# Patient Record
Sex: FEMALE | Race: WHITE | NOT HISPANIC OR LATINO | Employment: STUDENT | URBAN - METROPOLITAN AREA
[De-identification: names, ages, dates, MRNs, and addresses within clinical notes are randomized per-mention and may not be internally consistent; named-entity substitution may affect disease eponyms.]

---
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
## 2022-01-21 NOTE — PROGRESS NOTES
----- Message from Jordyn Avalos sent at 10/7/2019  4:52 PM CDT -----  Contact: pt@ [phone]  Caller is returning a missed call from Dr. Oretz's office  (Evelina), please call.   ----- Message from Marv Radford sent at 10/7/2019 12:50 PM CDT -----  [facility]: [facility]  Pt is calling to move botox appt on 11/08 to 11/07 or the week after   Left message on voicemail requesting a call back.    Left message on voicemail requesting a call back.    Subjective     Juana Bacon is a 18 year old female who presents to clinic today for the following health issues:    HPI   Concern - Toe infection   Onset: 10 days     Description:   Big toe, left foot. [place] thought [place] had an ingrown toenail, now she is wondering if it was some sort of cut that got [place] the [place].   outer area of toenail is oozing when left open. Redness, slight swelling.  Pt states its very painful.     Intensity: moderate    Progression of Symptoms:  Worsening      Therapies Tried and outcome: soaked in hot water (yesterday), tried an OTC ingrown toenail gel.     Patient Active Problem List   Diagnosis     Exercise-induced asthma     Excessive or frequent menstruation     Past Surgical History:   Procedure Laterality Date     EXCISE MASS FOOT  2013    Procedure: EXCISE MASS FOOT;  Excise soft tissue mass left foot;  Surgeon: Mick Walden DPM;  Location: [facility] OR     NO HISTORY OF SURGERY         Social History     Tobacco Use     Smoking status: Never Smoker     Smokeless tobacco: Never Used     Tobacco comment: No smokers in the home   Substance Use Topics     Alcohol use: No     Family History   Problem Relation Age of Onset     Congenital Anomalies Sister          at 3 days old from complications of intrauterine chicken pox     Diabetes Paternal Grandfather      Cancer Paternal Grandfather         kidney     Hypertension Paternal Grandfather      Cancer Paternal Grandmother         voice box         Current Outpatient Medications   Medication Sig Dispense Refill     albuterol (PROAIR HFA, PROVENTIL HFA, VENTOLIN HFA) 108 (90 BASE) MCG/ACT inhaler 2 puffs with spacer 15 min before exercise 1 Inhaler 6     norgestimate-ethinyl estradiol (ORTHO-CYCLEN/SPRINTEC) 0.25-35 MG-MCG tablet Take 1 tablet by mouth daily 84 tablet 3     polyethylene glycol (MIRALAX) powder Take 17 g (1 capful) by mouth daily 510 g 1      sulfamethoxazole-trimethoprim (BACTRIM DS/SEPTRA DS) 800-160 MG tablet Take 1 tablet by mouth 2 times daily for 10 days 20 tablet 0     Allergies   Allergen Reactions     Amoxicillin Hives     Recent Labs   Lab Test 10/04/18  1605 01/12/17  1612   ALT  --  15   CR  --  0.70   GFRESTIMATED  --  GFR not calculated, patient <16 years old.  Non  GFR Calc     GFRESTBLACK  --  GFR not calculated, patient <16 years old.   GFR Calc     POTASSIUM  --  4.2   TSH 1.56  --       BP Readings from Last 3 Encounters:   07/15/19 114/70 (64 %/ 66 %)*   01/07/19 94/62 (5 %/ 31 %)*   10/22/18 102/68 (23 %/ 58 %)*     *BP percentiles are based on the August 2017 AAP Clinical Practice Guideline for girls    Wt Readings from Last 3 Encounters:   07/15/19 58.5 kg (128 lb 14.4 oz) (65 %)*   01/07/19 54.7 kg (120 lb 8 oz) (52 %)*   10/22/18 58.3 kg (128 lb 8 oz) (67 %)*     * Growth percentiles are based on CDC (Girls, 2-20 Years) data.                    Reviewed and updated as needed this visit by Provider         Review of Systems   ROS COMP: Constitutional, HEENT, cardiovascular, pulmonary, GI, , musculoskeletal, neuro, skin, endocrine and psych systems are negative, except as otherwise noted.      Objective    There were no vitals taken for this visit.  There is no height or weight on file to calculate BMI.  Physical Exam   GENERAL: healthy, alert and no distress  NECK: no adenopathy, no asymmetry, masses, or scars and thyroid normal to palpation  RESP: lungs clear to auscultation - no rales, rhonchi or wheezes  CV: regular rate and rhythm, normal S1 S2, no S3 or S4, no murmur, click or rub, no peripheral edema and peripheral pulses strong  SKIN: left great toenail ingrown and erythema, tender, warm.     Assessment & Plan     1. Infection, nail, ingrowing  Home care instructions were reviewed with the patient. The risks, benefits and treatment options of prescribed medications or other treatments have  been discussed with the patient. The patient verbalized their understanding and should call or follow up if no improvement or if they develop further problems.    - sulfamethoxazole-trimethoprim (BACTRIM DS/SEPTRA DS) 800-160 MG tablet; Take 1 tablet by mouth 2 times daily for 10 days  Dispense: 20 tablet; Refill: 0       Patient Instructions   Soak in epsom salt 2 times daily for 20 minutes then lift up on nail bed to express puss.     Please take antibiotic with a probiotic or yogurt (3 small containers) daily not directly with the antibiotic for optimal good bacterial protection.     Return to clinic if symptoms do not clear with treatment plan      VIKI Saeed [facility]     Alert-The patient is alert, awake and responds to voice. The patient is oriented to time, place, and person. The triage nurse is able to obtain subjective information.

## 2022-05-12 ENCOUNTER — OFFICE VISIT (OUTPATIENT)
Dept: PODIATRY | Facility: CLINIC | Age: 22
End: 2022-05-12
Payer: COMMERCIAL

## 2022-05-12 VITALS
BODY MASS INDEX: 23.16 KG/M2 | DIASTOLIC BLOOD PRESSURE: 66 MMHG | HEIGHT: 65 IN | SYSTOLIC BLOOD PRESSURE: 100 MMHG | WEIGHT: 139 LBS

## 2022-05-12 DIAGNOSIS — S86.891A RIGHT MEDIAL TIBIAL STRESS SYNDROME, INITIAL ENCOUNTER: Primary | ICD-10-CM

## 2022-05-12 DIAGNOSIS — Q66.6 PES VALGUS: ICD-10-CM

## 2022-05-12 PROCEDURE — 99203 OFFICE O/P NEW LOW 30 MIN: CPT | Performed by: PODIATRIST

## 2022-05-12 ASSESSMENT — PAIN SCALES - GENERAL: PAINLEVEL: NO PAIN (0)

## 2022-05-12 NOTE — LETTER
2022         RE: Juana Bacon  [address] [facility] [address]        Dear Colleague,    Thank you for referring your patient, Juana Bacon, to the [facility]. Please see a copy of my visit note below.    HPI:  Juana Bacon is a 21 year old female who is seen in consultation at the request of self.    Pt presents for eval of:   (Onset, Location, L/R, Character, Treatments, Injury if yes)     Onset 2021, left and right ankle pain that radiates to feet and lower legs. History of shin splints.  Constant swelling. Intermittent, throbbing, dull ache, pain 5-9/10, worse with running.    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    Patient Active Problem List   Diagnosis     Exercise-induced asthma     Excessive or frequent menstruation       PAST MEDICAL HISTORY:   Past Medical History:   Diagnosis Date     Exercise-induced asthma      Hydronephrosis, right 2017     Unspecified fetal and  jaundice     treated with phototherapy in the  period     Volume depletion     hospitalized at 7 mo for dehydration with Rotavirus        PAST SURGICAL HISTORY:   Past Surgical History:   Procedure Laterality Date     EXCISE MASS FOOT  2013    Procedure: EXCISE MASS FOOT;  Excise soft tissue mass left foot;  Surgeon: Mick Walden DPM;  Location: [facility] OR     NO HISTORY OF SURGERY          MEDICATIONS:   Current Outpatient Medications:      AJOVY SOSY subcutaneous, , Disp: , Rfl:      naproxen (NAPROSYN) 500 MG tablet, TK 1 T PO BID PRF HA, Disp: , Rfl:      norgestimate-ethinyl estradiol (ORTHO-CYCLEN) 0.25-35 MG-MCG tablet, Take 1 tablet by mouth daily, Disp: 84 tablet, Rfl: 3     polyethylene glycol (MIRALAX) 17 GM/Dose powder, Take 17 g (1 capful) by mouth  "daily, Disp: 510 g, Rfl: 1     ALLERGIES:    Allergies   Allergen Reactions     Amoxicillin Hives        SOCIAL HISTORY:   Social History     Socioeconomic History     Marital status: Single     Spouse name: Not on file     Number of children: Not on file     Years of education: Not on file     Highest education level: Not on file   Occupational History     Not on file   Tobacco Use     Smoking status: Never Smoker     Smokeless tobacco: Never Used     Tobacco comment: No smokers in the home   Vaping Use     Vaping Use: Never used   Substance and Sexual Activity     Alcohol use: No     Drug use: No     Sexual activity: Not Currently     Partners: Male     Birth control/protection: Pill   Other Topics Concern     Parent/sibling w/ CABG, MI or angioplasty before 65F 55M? No   Social History Narrative     Not on file     Social Determinants of Health     Financial Resource Strain: Not on file   Food Insecurity: Not on file   Transportation Needs: Not on file   Physical Activity: Not on file   Stress: Not on file   Social Connections: Not on file   Intimate Partner Violence: Not on file   Housing Stability: Not on file        FAMILY HISTORY:   Family History   Problem Relation Age of Onset     Congenital Anomalies Sister          at 3 days old from complications of intrauterine chicken pox     Diabetes Paternal Grandfather      Cancer Paternal Grandfather         kidney     Hypertension Paternal Grandfather      Cancer Paternal Grandmother         voice box        EXAM:Vitals: /66   Ht 1.651 m (5' 5\")   Wt 63 kg (139 lb)   LMP 2022 (Approximate)   Breastfeeding No   BMI 23.13 kg/m    BMI= Body mass index is 23.13 kg/m .    General appearance: Patient is alert and fully cooperative with history & exam.  No sign of distress is noted during the visit.     Psychiatric: Affect is pleasant & appropriate.  Patient appears motivated to improve health.     Respiratory: Breathing is regular & unlabored while " sitting.     HEENT: Hearing is intact to spoken word.  Speech is clear.  No gross evidence of visual impairment that would impact ambulation.     Vascular: DP & PT pulses are intact & regular bilaterally.  No significant edema or varicosities noted.  CFT and skin temperature is normal to both lower extremities.     Neurologic: Lower extremity sensation is intact to light touch.  No evidence of weakness or contracture in the lower extremities.  No evidence of neuropathy.    Dermatologic: Skin is intact to both lower extremities with adequate texture, turgor and tone about the integument.  No paronychia or evidence of soft tissue infection is noted.     Musculoskeletal: Patient is ambulatory without assistive device or brace.  Upon first evaluation her foot looks fairly rectus however upon loading her foot significant valgus is noted with the foot lateral to the leg and mild tibial varum.  No crepitus throughout range of motion of the rear foot or ankle.  Subtle discomfort noted with firm palpation along the posterior medial crest of the tibia on the distal one third of both legs right greater than left.  Manual muscle strength is 5/5 to all 4 quadrants.  Minimal symptoms with firm palpation about the posterior subtalar and ankle joint margin.  No pain with plantarflexion of the hallux against resistance.    CT scan demonstrates os trigonum.     ASSESSMENT:       ICD-10-CM    1. Right medial tibial stress syndrome, initial encounter  S86.891A [facility] DME Orthotic; Foot Orthotics; Bilateral   2. Pes valgus  Q66.6 [facility]otics [facility] Prosthetics DME Orthotic; Foot Orthotics; Bilateral        PLAN:  Reviewed patient's chart in [facility].      5/12/2022   Interpreted CT scan written results  Recommended a custom molded orthotic and appropriate training techniques and discussed etiology and treatment options regarding shinsplints.  May follow-up if this remains symptomatic.    Jose Garza DPM            Again,  thank you for allowing me to participate in the care of your patient.        Sincerely,        Jose Garza DPM

## 2022-05-12 NOTE — PATIENT INSTRUCTIONS
Reliable shoe stores: To maximize your experience and provide the best possible fit.  Be sure to show them your foot concerns and tell them Dr. Garza sent you.      Stores listed in bold have only athletic shoes, and stores that are not bold are mostly casual or variety of shoes    [employer]  [address] [place] [address]  [phone]    [facility] [facility] [place]  [address] [place] [address]  [phone]     [facility] [place]  [address] [place] [address]  [phone]    [employer]  [address] [facility] [address]  [phone]    [employer]  [address] [place], [place] [address] [phone]    [employer]  [address] [place] [address]  [phone]                         [employer] [employer] Locations:     [address] [place] [address]   [phone]     [address] [place] [address] [address] [place] [address]   [phone]     [address] [place] [address]   [phone]     [address] [facility] [place] [address]   [phone]     [address] [place] [address]   [phone]     [address] [place] [address]   [phone]     [address] [facility] [address]   [place] [address]   [phone]     [address] [facility]   [place] [address]   [phone]             How to find reasonable shoes          The correct width    Correct Fitting    Correct Length      Foot Distortion    Posture Distortion                          Torsional Rigidity      Grasp behind the heel and underneath the foot and twist      Bad    Excessive torsion/twist in midfoot     Less torsion/twist in midfoot is better                   Heel Counter Rigidity      Grasp just above   midsole and squeeze      Bad    Soft heel counter      Good    Rigid Heel Counter      Flexion Rigidity      Grasp shoe and bend from forefoot to rearfoot                  Chronic ankle instability    Diminished ankle  "proprioception.  Strengthen the muscles that cross the ankle to prevent instability and loss of joint function.    [email or web address]     Search youtube for \"natalioarsamara girl\" to understand how they work    Bongo board or [employer] from [email or web address]    "

## 2022-05-12 NOTE — PROGRESS NOTES
HPI:  Juana Bacon is a 21 year old female who is seen in consultation at the request of self.    Pt presents for eval of:   (Onset, Location, L/R, Character, Treatments, Injury if yes)     Onset 2021, left and right ankle pain that radiates to feet and lower legs. History of shin splints.  Constant swelling. Intermittent, throbbing, dull ache, pain 5-9/10, worse with running.    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    Patient Active Problem List   Diagnosis     Exercise-induced asthma     Excessive or frequent menstruation       PAST MEDICAL HISTORY:   Past Medical History:   Diagnosis Date     Exercise-induced asthma      Hydronephrosis, right 2017     Unspecified fetal and  jaundice     treated with phototherapy in the  period     Volume depletion     hospitalized at 7 mo for dehydration with Rotavirus        PAST SURGICAL HISTORY:   Past Surgical History:   Procedure Laterality Date     EXCISE MASS FOOT  2013    Procedure: EXCISE MASS FOOT;  Excise soft tissue mass left foot;  Surgeon: Mick Walden DPM;  Location: [facility] OR     NO HISTORY OF SURGERY          MEDICATIONS:   Current Outpatient Medications:      AJOVY SOSY subcutaneous, , Disp: , Rfl:      naproxen (NAPROSYN) 500 MG tablet, TK 1 T PO BID PRF HA, Disp: , Rfl:      norgestimate-ethinyl estradiol (ORTHO-CYCLEN) 0.25-35 MG-MCG tablet, Take 1 tablet by mouth daily, Disp: 84 tablet, Rfl: 3     polyethylene glycol (MIRALAX) 17 GM/Dose powder, Take 17 g (1 capful) by mouth daily, Disp: 510 g, Rfl: 1     ALLERGIES:    Allergies   Allergen Reactions     Amoxicillin Hives        SOCIAL HISTORY:   Social History     Socioeconomic History     Marital status: Single     Spouse name: Not on file     Number of children: Not  "on file     Years of education: Not on file     Highest education level: Not on file   Occupational History     Not on file   Tobacco Use     Smoking status: Never Smoker     Smokeless tobacco: Never Used     Tobacco comment: No smokers in the home   Vaping Use     Vaping Use: Never used   Substance and Sexual Activity     Alcohol use: No     Drug use: No     Sexual activity: Not Currently     Partners: Male     Birth control/protection: Pill   Other Topics Concern     Parent/sibling w/ CABG, MI or angioplasty before 65F 55M? No   Social History Narrative     Not on file     Social Determinants of Health     Financial Resource Strain: Not on file   Food Insecurity: Not on file   Transportation Needs: Not on file   Physical Activity: Not on file   Stress: Not on file   Social Connections: Not on file   Intimate Partner Violence: Not on file   Housing Stability: Not on file        FAMILY HISTORY:   Family History   Problem Relation Age of Onset     Congenital Anomalies Sister          at 3 days old from complications of intrauterine chicken pox     Diabetes Paternal Grandfather      Cancer Paternal Grandfather         kidney     Hypertension Paternal Grandfather      Cancer Paternal Grandmother         voice box        EXAM:Vitals: /66   Ht 1.651 m (5' 5\")   Wt 63 kg (139 lb)   LMP 2022 (Approximate)   Breastfeeding No   BMI 23.13 kg/m    BMI= Body mass index is 23.13 kg/m .    General appearance: Patient is alert and fully cooperative with history & exam.  No sign of distress is noted during the visit.     Psychiatric: Affect is pleasant & appropriate.  Patient appears motivated to improve health.     Respiratory: Breathing is regular & unlabored while sitting.     HEENT: Hearing is intact to spoken word.  Speech is clear.  No gross evidence of visual impairment that would impact ambulation.     Vascular: DP & PT pulses are intact & regular bilaterally.  No significant edema or varicosities " noted.  CFT and skin temperature is normal to both lower extremities.     Neurologic: Lower extremity sensation is intact to light touch.  No evidence of weakness or contracture in the lower extremities.  No evidence of neuropathy.    Dermatologic: Skin is intact to both lower extremities with adequate texture, turgor and tone about the integument.  No paronychia or evidence of soft tissue infection is noted.     Musculoskeletal: Patient is ambulatory without assistive device or brace.  Upon first evaluation her foot looks fairly rectus however upon loading her foot significant valgus is noted with the foot lateral to the leg and mild tibial varum.  No crepitus throughout range of motion of the rear foot or ankle.  Subtle discomfort noted with firm palpation along the posterior medial crest of the tibia on the distal one third of both legs right greater than left.  Manual muscle strength is 5/5 to all 4 quadrants.  Minimal symptoms with firm palpation about the posterior subtalar and ankle joint margin.  No pain with plantarflexion of the hallux against resistance.    CT scan demonstrates os trigonum.     ASSESSMENT:       ICD-10-CM    1. Right medial tibial stress syndrome, initial encounter  S86.891A Orthotics and Prosthetics DME Orthotic; Foot Orthotics; Bilateral   2. Pes valgus  Q66.6 Orthotics and Prosthetics DME Orthotic; Foot Orthotics; Bilateral        PLAN:  Reviewed patient's chart in [facility].      5/12/2022   Interpreted CT scan written results  Recommended a custom molded orthotic and appropriate training techniques and discussed etiology and treatment options regarding shinsplints.  May follow-up if this remains symptomatic.    Jose Garza DPM

## 2022-05-27 DIAGNOSIS — N94.6 DYSMENORRHEA: ICD-10-CM

## 2022-05-31 RX ORDER — NORGESTIMATE AND ETHINYL ESTRADIOL 0.25-0.035
1 KIT ORAL DAILY
Qty: 84 TABLET | Refills: 0 | Status: SHIPPED | OUTPATIENT
Start: 2022-05-31 | End: 2022-08-09

## 2022-05-31 NOTE — TELEPHONE ENCOUNTER
Pending Prescriptions:                       Disp   Refills    norgestimate-ethinyl estradiol (ORTHO-CYC*84 tab*0            Sig: Take 1 tablet by mouth daily    Medication is being filled for 1 time marleni refill only due to:  Patient is due for annual in Aug    Please call and help schedule.  Thank you!

## 2022-08-09 ENCOUNTER — OFFICE VISIT (OUTPATIENT)
Dept: FAMILY MEDICINE | Facility: CLINIC | Age: 22
End: 2022-08-09
Payer: COMMERCIAL

## 2022-08-09 ENCOUNTER — MYC MEDICAL ADVICE (OUTPATIENT)
Dept: FAMILY MEDICINE | Facility: CLINIC | Age: 22
End: 2022-08-09

## 2022-08-09 VITALS
OXYGEN SATURATION: 99 % | SYSTOLIC BLOOD PRESSURE: 116 MMHG | DIASTOLIC BLOOD PRESSURE: 70 MMHG | HEART RATE: 74 BPM | HEIGHT: 65 IN | RESPIRATION RATE: 14 BRPM | BODY MASS INDEX: 23.74 KG/M2 | WEIGHT: 142.5 LBS | TEMPERATURE: 98.1 F

## 2022-08-09 DIAGNOSIS — Z00.129 ENCOUNTER FOR ROUTINE CHILD HEALTH EXAMINATION W/O ABNORMAL FINDINGS: Primary | ICD-10-CM

## 2022-08-09 DIAGNOSIS — J45.990 EXERCISE-INDUCED ASTHMA: ICD-10-CM

## 2022-08-09 DIAGNOSIS — N94.6 DYSMENORRHEA: ICD-10-CM

## 2022-08-09 DIAGNOSIS — Z11.3 SCREENING FOR STDS (SEXUALLY TRANSMITTED DISEASES): ICD-10-CM

## 2022-08-09 PROCEDURE — 99395 PREV VISIT EST AGE 18-39: CPT | Performed by: NURSE PRACTITIONER

## 2022-08-09 PROCEDURE — 87591 N.GONORRHOEAE DNA AMP PROB: CPT | Performed by: NURSE PRACTITIONER

## 2022-08-09 PROCEDURE — 96127 BRIEF EMOTIONAL/BEHAV ASSMT: CPT | Performed by: NURSE PRACTITIONER

## 2022-08-09 PROCEDURE — 87491 CHLMYD TRACH DNA AMP PROBE: CPT | Performed by: NURSE PRACTITIONER

## 2022-08-09 RX ORDER — ALBUTEROL SULFATE 90 UG/1
2 AEROSOL, METERED RESPIRATORY (INHALATION) EVERY 6 HOURS
Qty: 18 G | Refills: 4 | Status: SHIPPED | OUTPATIENT
Start: 2022-08-09 | End: 2024-06-12

## 2022-08-09 RX ORDER — NORGESTIMATE AND ETHINYL ESTRADIOL 0.25-0.035
1 KIT ORAL DAILY
Qty: 84 TABLET | Refills: 3 | Status: SHIPPED | OUTPATIENT
Start: 2022-08-09 | End: 2022-09-12

## 2022-08-09 SDOH — ECONOMIC STABILITY: INCOME INSECURITY: IN THE LAST 12 MONTHS, WAS THERE A TIME WHEN YOU WERE NOT ABLE TO PAY THE MORTGAGE OR RENT ON TIME?: NO

## 2022-08-09 ASSESSMENT — ASTHMA QUESTIONNAIRES
QUESTION_1 LAST FOUR WEEKS HOW MUCH OF THE TIME DID YOUR ASTHMA KEEP YOU FROM GETTING AS MUCH DONE AT WORK, SCHOOL OR AT HOME: NONE OF THE TIME
QUESTION_5 LAST FOUR WEEKS HOW WOULD YOU RATE YOUR ASTHMA CONTROL: COMPLETELY CONTROLLED
QUESTION_3 LAST FOUR WEEKS HOW OFTEN DID YOUR ASTHMA SYMPTOMS (WHEEZING, COUGHING, SHORTNESS OF BREATH, CHEST TIGHTNESS OR PAIN) WAKE YOU UP AT NIGHT OR EARLIER THAN USUAL IN THE MORNING: NOT AT ALL
QUESTION_2 LAST FOUR WEEKS HOW OFTEN HAVE YOU HAD SHORTNESS OF BREATH: NOT AT ALL
ACT_TOTALSCORE: 25
ACT_TOTALSCORE: 25
QUESTION_4 LAST FOUR WEEKS HOW OFTEN HAVE YOU USED YOUR RESCUE INHALER OR NEBULIZER MEDICATION (SUCH AS ALBUTEROL): NOT AT ALL

## 2022-08-09 ASSESSMENT — PAIN SCALES - GENERAL: PAINLEVEL: NO PAIN (0)

## 2022-08-09 NOTE — PROGRESS NOTES
{PROVIDER CHARTING PREFERENCE:068173}    Keysha Arias is a 21 year old{ACCOMPANIED BY STATEMENT (Optional):315064}, presenting for the following health issues:  No chief complaint on file.      History of Present Illness       Reason for visit:  Yearly Appointment    She eats 2-3 servings of fruits and vegetables daily.She consumes 1 sweetened beverage(s) daily.She exercises with enough effort to increase her heart rate 60 or more minutes per day.  She exercises with enough effort to increase her heart rate 5 days per week.   She is taking medications regularly.       {SUPERLIST (Optional):199853}  {additonal problems for provider to add (Optional):901599}    Review of Systems   {ROS COMP (Optional):307555}      Objective    There were no vitals taken for this visit.  There is no height or weight on file to calculate BMI.  Physical Exam   {Exam List (Optional):453296}    {Diagnostic Test Results (Optional):605422}    {AMBULATORY ATTESTATION (Optional):661033}              ..

## 2022-08-09 NOTE — LETTER
8/9/2022        RE: Juana KOVACS Jordi  [address] [facility] [address]      Juana was evaluated in the clinic and is approved to return to all sports without restriction.         Sincerely,        VIKI Giraldo CNP

## 2022-08-09 NOTE — PATIENT INSTRUCTIONS
Alex, radha, or what you have on today in the house all the time.       Patient Education    Pegg[employer]S HANDOUT- PATIENT  18 THROUGH 21 YEAR VISITS  Here are some suggestions from [employer] experts that may be of value to your family.     HOW YOU ARE DOING  Enjoy spending time with your family.  Find activities you are really interested in, such as sports, theater, or volunteering.  Try to be responsible for your schoolwork or work obligations.  Always talk through problems and never use violence.  If you get angry with someone, try to walk away.  If you feel unsafe in your home or have been hurt by someone, let us know. Hotlines and community agencies can also provide confidential help.  Talk with us if you are worried about your living or food situation. Community agencies and programs such as SNAP can help.  Don t smoke, vape, or use drugs. Avoid people who do when you can. Talk with us if you are worried about alcohol or drug use in your family.    YOUR DAILY LIFE  Visit the dentist at least twice a year.  Brush your teeth at least twice a day and floss once a day.  Be a healthy eater.  Have vegetables, fruits, lean protein, and whole grains at meals and snacks.  Limit fatty, sugary, salty foods that are low in nutrients, such as candy, chips, and ice cream.  Eat when you re hungry. Stop when you feel satisfied.  Eat breakfast.  Drink plenty of water.  Make sure to get enough calcium every day.  Have 3 or more servings of low-fat (1%) or fat-free milk and other low-fat dairy products, such as yogurt and cheese.  Women: Make sure to eat foods rich in folate, such as fortified grains and dark- green leafy vegetables.  Aim for at least 1 hour of physical activity every day.  Wear safety equipment when you play sports.  Get enough sleep.  Talk with us about managing your health care and insurance as an adult.    YOUR FEELINGS  Most people have ups and downs. If you are feeling sad, depressed,  nervous, irritable, hopeless, or angry, let us know or reach out to another health care professional.  Figure out healthy ways to deal with stress.  Try your best to solve problems and make decisions on your own.  Sexuality is an important part of your life. If you have any questions or concerns, we are here for you.    HEALTHY BEHAVIOR CHOICES  Avoid using drugs, alcohol, tobacco, steroids, and diet pills. Support friends who choose not to use.  If you use drugs or alcohol, let us know or talk with another trusted adult about it. We can help you with quitting or cutting down on your use.  Make healthy decisions about your sexual behavior.  If you are sexually active, always practice safe sex. Always use birth control along with a condom to prevent pregnancy and sexually transmitted infections.  All sexual activity should be something you want. No one should ever force or try to convince you.  Protect your hearing at work, home, and concerts. Keep your earbud volume down.    STAYING SAFE  Always be a safe and cautious .  Insist that everyone use a lap and shoulder seat belt.  Limit the number of friends in the car and avoid driving at night.  Avoid distractions. Never text or talk on the phone while you drive.  Do not ride in a vehicle with someone who has been using drugs or alcohol.  If you feel unsafe driving or riding with someone, call someone you trust to drive you.  Wear helmets and protective gear while playing sports. Wear a helmet when riding a bike, a motorcycle, or an ATV or when skiing or skateboarding.  Always use sunscreen and a hat when you re outside.  Fighting and carrying weapons can be dangerous. Talk with your parents, teachers, or doctor about how to avoid these situations.        Consistent with Bright Futures: Guidelines for Health Supervision of Infants, Children, and Adolescents, 4th Edition  For more information, go to https://brightfutures.aap.org.

## 2022-08-09 NOTE — PROGRESS NOTES
Juana Bacon is 21 year old, here for a preventive care visit.    Assessment & Plan     Diagnoses and all orders for this visit:    Encounter for routine child health examination w/o abnormal findings  -     BEHAVIORAL/EMOTIONAL ASSESSMENT (65950)    Screening for STDs (sexually transmitted diseases)  -     Cancel: NEISSERIA GONORRHOEA PCR; Future  -     Cancel: CHLAMYDIA TRACHOMATIS PCR; Future  -     CHLAMYDIA TRACHOMATIS PCR  -     NEISSERIA GONORRHOEA PCR    Dysmenorrhea  -     norgestimate-ethinyl estradiol (ORTHO-CYCLEN) 0.25-35 MG-MCG tablet; Take 1 tablet by mouth daily    Exercise-induced asthma  -     albuterol (PROAIR HFA/PROVENTIL HFA/VENTOLIN HFA) 108 (90 Base) MCG/ACT inhaler; Inhale 2 puffs into the lungs every 6 hours    Other orders  -     REVIEW OF HEALTH MAINTENANCE PROTOCOL ORDERS        Growth        Normal height and weight    No weight concerns.    Immunizations     Vaccines up to date.  MenB Vaccine not indicated.    Anticipatory Guidance    Reviewed age appropriate anticipatory guidance.   Reviewed Anticipatory Guidance in patient instructions        Referrals/Ongoing Specialty Care  Verbal referral for routine dental care    Follow Up      Return in 1 year (on 8/9/2023) for Preventive Care visit.    Subjective     No flowsheet data found.      Social 8/9/2022   Who do you live with? Family, Friends or roommates   Have you experienced any stressful events recently? None   In the past 12 months, has lack of transportation kept you from medical appointments or from getting medications? No   In the last 12 months, was there a time when you were not able to pay the mortgage or rent on time? No   In the last 12 months, was there a time when you did not have a steady place to sleep or slept in a shelter (including now)? No       Health Risks/Safety 8/9/2022   Do you always wear a seat belt? Yes   Do you wear a helmet for bicyle, rollerblades, skatebard, scooter, skiing/snowboarding,  ATV/snowmobile, motorcycle?  (!) NO       TB Screening 8/9/2022   Were you born outside of the [place]? No     TB Screening 8/9/2022   Since your last Well Child visit, have any of your family members or close contacts had tuberculosis or a positive tuberculosis test?  No   Since your last check-up, have you or any of your family members or close contacts traveled or lived outside of the [place]? No   Since your last check-up, have you lived in a high-risk group setting like a correctional facility, health care facility, homeless shelter, or refugee camp? No        Dyslipidemia Screening 8/9/2022   Have any of your parents or grandparents had a stroke or heart attack before age 55 for males or before age 65 for females? No   Do either of your parents have high cholesterol or currently taking medications to treat? (!) YES    Risk Factors: None    No flowsheet data found.    Diet 8/9/2022   Do you have questions about your eating?  No   Do you have questions about your weight?  No   What do you regularly drink? Water   What type of water? (!) FILTERED   Do you think you eat healthy foods? Yes   Do you get at least 3 servings of food or beverages that have calcium each day (dairy, green leafy vegetables, etc.)? Yes   How would you describe your diet?  No restrictions   Within the past 12 months, you worried that your food would run out before you got money to buy more. Never true   Within the past 12 months, the food you bought just didn't last and you didn't have money to get more. Never true       Activity 8/9/2022   On average, how many days per week do you engage in moderate to strenuous exercise (like walking fast, running, jogging, dancing, swimming, biking, or other activities that cause a light or heavy sweat)? 5 days   On average, how many minutes do you engage in exercise at this level? (!) 50 MINUTES   What do you do for exercise? Sports and weight lifting   What activities are you involved with?  sports teams     Media Use 2022   How many hours per day are you viewing a screen?  2     Sleep 2022   Do you have any trouble with sleep? No     Vision/Hearing 2022   Do you have any concerns about your hearing or vision?  No concerns     Vision Screen  Vision Screen Details  Reason Vision Screen Not Completed: Parent declined - No concerns    Hearing Screen  Hearing Screen Not Completed  Reason Hearing Screen was not completed: Parent declined - No concerns      School 2022   Are you in school? Yes   What school do you attend?  [employer]   What do you do for work?        Psycho-Social/Depression - PSC-17 required for C&TC through age 18  General screening:    Electronic PSC-17   PSC SCORES 2019   Y-PSC Total Score 2 (Negative)      PSC-17 PASS (<15), no follow up necessary  Teen Screen  Teen Screen completed, reviewed and scanned document within chart.    [facility] MENSES SECTION 2022   What are your periods like?  Regular     [place] [employer] Physical 2022   Do you have any concerns that you would like to discuss with your provider? (!) YES   Has a provider ever denied or restricted your participation in sports for any reason? No   Do you have any ongoing medical issues or recent illness? (!) YES   Have you ever passed out or nearly passed out during or after exercise? No   Have you ever had discomfort, pain, tightness, or pressure in your chest during exercise? No   Does your heart ever race, flutter in your chest, or skip beats (irregular beats) during exercise? No   Has a doctor ever told you that you have any heart problems? No   Has a doctor ever requested a test for your heart? For example, electrocardiography (ECG) or echocardiography. No   Do you ever get light-headed or feel shorter of breath than your friends during exercise?  No   Have you ever had a seizure?  No   Has any family member or relative  of heart problems or had an unexpected or  unexplained sudden death before age 35 years (including drowning or unexplained car crash)? No   Does anyone in your family have a genetic heart problem such as hypertrophic cardiomyopathy (HCM), Marfan syndrome, arrhythmogenic right ventricular cardiomyopathy (ARVC), long QT syndrome (LQTS), short QT syndrome (SQTS), Brugada syndrome, or catecholaminergic polymorphic ventricular tachycardia (CPVT)?   No   Has anyone in your family had a pacemaker or an implanted defibrillator before age 35? No   Have you ever had a stress fracture or an injury to a bone, muscle, ligament, joint, or tendon that caused you to miss a practice or game? No   Do you have a bone, muscle, ligament, or joint injury that bothers you?  No   Do you cough, wheeze, or have difficulty breathing during or after exercise?   No   Are you missing a kidney, an eye, a testicle (males), your spleen, or any other organ? No   Do you have groin or testicle pain or a painful bulge or hernia in the groin area? No   Do you have any recurring skin rashes or rashes that come and go, including herpes or methicillin-resistant Staphylococcus aureus (MRSA)? No   Have you had a concussion or head injury that caused confusion, a prolonged headache, or memory problems? No   Have you ever had numbness, tingling, weakness in your arms or legs, or been unable to move your arms or legs after being hit or falling? No   Have you ever become ill while exercising in the heat? (!) YES   Do you or does someone in your family have sickle cell trait or disease? No   Have you ever had, or do you have any problems with your eyes or vision? No   Do you worry about your weight? No   Are you trying to or has anyone recommended that you gain or lose weight? No   Are you on a special diet or do you avoid certain types of foods or food groups? No   Have you ever had an eating disorder? No   Have you ever had a menstrual period? Yes   How old were you when you had your first menstrual  "period? 14   When was your most recent menstrual period? 7/20/22   How many periods have you had in the past 12 months? 12     Constitutional, eye, ENT, skin, respiratory, cardiac, and GI are normal except as otherwise noted.       Objective     Exam  /70   Pulse 74   Temp 98.1  F (36.7  C) (Temporal)   Resp 14   Ht 1.64 m (5' 4.57\")   Wt 64.6 kg (142 lb 8 oz)   LMP 07/20/2022 (Exact Date)   SpO2 99%   BMI 24.03 kg/m    54 %ile (Z= 0.11) based on CDC (Girls, 2-20 Years) Stature-for-age data based on Stature recorded on 8/9/2022.  72 %ile (Z= 0.60) based on CDC (Girls, 2-20 Years) weight-for-age data using vitals from 8/9/2022.  73 %ile (Z= 0.60) based on CDC (Girls, 2-20 Years) BMI-for-age based on BMI available as of 8/9/2022.  Blood pressure percentiles are not available for patients who are 18 years or older.  Physical Exam  GENERAL: Active, alert, in no acute distress.  SKIN: Clear. No significant rash, abnormal pigmentation or lesions  HEAD: Normocephalic  EYES: Pupils equal, round, reactive, Extraocular muscles intact. Normal conjunctivae.  EARS: Normal canals. Tympanic membranes are normal; gray and translucent.  NOSE: Normal without discharge.  MOUTH/THROAT: Clear. No oral lesions. Teeth without obvious abnormalities.  NECK: Supple, no masses.  No thyromegaly.  LYMPH NODES: No adenopathy  LUNGS: Clear. No rales, rhonchi, wheezing or retractions  HEART: Regular rhythm. Normal S1/S2. No murmurs. Normal pulses.  ABDOMEN: Soft, non-tender, not distended, no masses or hepatosplenomegaly. Bowel sounds normal.   NEUROLOGIC: No focal findings. Cranial nerves grossly intact: DTR's normal. Normal gait, strength and tone  BACK: Spine is straight, no scoliosis.  EXTREMITIES: Full range of motion, no deformities  : Exam declined by parent/patient.  Reason for decline: Patient/Parental preference            VIKI Giraldo [facility] LUIS  Answers for HPI/ROS submitted by the " patient on 8/9/2022  What is the reason for your visit today? : Yearly Appointment  How many servings of fruits and vegetables do you eat daily?: 2-3  On average, how many sweetened beverages do you drink each day (Examples: soda, juice, sweet tea, etc.  Do NOT count diet or artificially sweetened beverages)?: 1  How many minutes a day do you exercise enough to make your heart beat faster?: 60 or more  How many days a week do you exercise enough to make your heart beat faster?: 5  How many days per week do you miss taking your medication?: 0

## 2022-08-10 LAB
C TRACH DNA SPEC QL NAA+PROBE: NEGATIVE
N GONORRHOEA DNA SPEC QL NAA+PROBE: NEGATIVE

## 2022-08-12 NOTE — TELEPHONE ENCOUNTER
Provider please review.  [facility]    Joseph Orlando, GAGEN, RN, PHN  [facility] ~ Registered Nurse  Clinic Triage ~ [place] [facility] [place]  August 12, 2022

## 2022-08-22 ENCOUNTER — TELEPHONE (OUTPATIENT)
Dept: PODIATRY | Facility: CLINIC | Age: 22
End: 2022-08-22

## 2022-08-22 ENCOUNTER — OFFICE VISIT (OUTPATIENT)
Dept: PODIATRY | Facility: CLINIC | Age: 22
End: 2022-08-22
Payer: COMMERCIAL

## 2022-08-22 VITALS
HEIGHT: 65 IN | WEIGHT: 142.5 LBS | BODY MASS INDEX: 23.74 KG/M2 | DIASTOLIC BLOOD PRESSURE: 66 MMHG | SYSTOLIC BLOOD PRESSURE: 112 MMHG

## 2022-08-22 DIAGNOSIS — Q68.8 OS TRIGONUM SYNDROME: Primary | ICD-10-CM

## 2022-08-22 PROCEDURE — 99213 OFFICE O/P EST LOW 20 MIN: CPT | Performed by: PODIATRIST

## 2022-08-22 ASSESSMENT — PAIN SCALES - GENERAL: PAINLEVEL: MODERATE PAIN (5)

## 2022-08-22 NOTE — LETTER
2022         RE: Juana Bacon  [address] [facility] [address]        Dear Colleague,    Thank you for referring your patient, Juana Bacon, to the [facility]. Please see a copy of my visit note below.    Chief Complaint   Patient presents with     RECHECK     Presents with posterior Right achilles pain after obtaining orthotics and new shoes - Right medial tibial stress syndrome and pes valgus; MRI L ankle and XR B/L ankle 2022; LOV      HPI:  Juana Bacon is a 21 year old female who is seen in consultation at the request of self.    Pt presents for eval of:   (Onset, Location, L/R, Character, Treatments, Injury if yes)     Onset 2021, left and right ankle pain that radiates to feet and lower legs. History of shin splints heather right.  Constant swelling. Intermittent, throbbing, dull ache, pain 5-9/10, worse with running.    Massage, taping, compression sleeves, different cleats  Had os trigonum injection left ankle April and gave good results for 2 months.   Today wondering what she would have to do to go through this procedure and whether she should do this here or at [employer].  Her ankle remains painful now limiting her physical activities.    Student at [employer], participates in [employer]. 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    Patient Active Problem List   Diagnosis     Exercise-induced asthma     Excessive or frequent menstruation       PAST MEDICAL HISTORY:   Past Medical History:   Diagnosis Date     Exercise-induced asthma      Hydronephrosis, right 2017     Unspecified fetal and  jaundice     treated with phototherapy in the  period     Volume depletion     hospitalized at 7 mo for dehydration with Rotavirus        PAST SURGICAL HISTORY:   Past Surgical History:   Procedure Laterality Date     EXCISE MASS  FOOT  9/30/2013    Procedure: EXCISE MASS FOOT;  Excise soft tissue mass left foot;  Surgeon: Mick Walden DPM;  Location: [facility] OR     NO HISTORY OF SURGERY          MEDICATIONS:   Current Outpatient Medications:      AJOVY SOSY subcutaneous, , Disp: , Rfl:      albuterol (PROAIR HFA/PROVENTIL HFA/VENTOLIN HFA) 108 (90 Base) MCG/ACT inhaler, Inhale 2 puffs into the lungs every 6 hours, Disp: 18 g, Rfl: 4     naproxen (NAPROSYN) 500 MG tablet, TK 1 T PO BID PRF HA, Disp: , Rfl:      norgestimate-ethinyl estradiol (ORTHO-CYCLEN) 0.25-35 MG-MCG tablet, Take 1 tablet by mouth daily, Disp: 84 tablet, Rfl: 3     polyethylene glycol (MIRALAX) 17 GM/Dose powder, Take 17 g (1 capful) by mouth daily, Disp: 510 g, Rfl: 1     ALLERGIES:    Allergies   Allergen Reactions     Amoxicillin Hives        SOCIAL HISTORY:   Social History     Socioeconomic History     Marital status: Single     Spouse name: Not on file     Number of children: Not on file     Years of education: Not on file     Highest education level: Not on file   Occupational History     Not on file   Tobacco Use     Smoking status: Never Smoker     Smokeless tobacco: Never Used     Tobacco comment: No smokers in the home   Vaping Use     Vaping Use: Never used   Substance and Sexual Activity     Alcohol use: No     Drug use: No     Sexual activity: Yes     Partners: Male     Birth control/protection: Pill   Other Topics Concern     Parent/sibling w/ CABG, MI or angioplasty before 65F 55M? No   Social History Narrative     Not on file     Social Determinants of Health     Financial Resource Strain: Not on file   Food Insecurity: Not on file   Transportation Needs: Not on file   Physical Activity: Not on file   Stress: Not on file   Social Connections: Not on file   Intimate Partner Violence: Not on file   Housing Stability: Unknown     Unable to Pay for Housing in the Last Year: No     Number of Places Lived in the Last Year: Not on file     Unstable Housing in  "the Last Year: No        FAMILY HISTORY:   Family History   Problem Relation Age of Onset     Congenital Anomalies Sister          at 3 days old from complications of intrauterine chicken pox     Diabetes Paternal Grandfather      Cancer Paternal Grandfather         kidney     Hypertension Paternal Grandfather      Cancer Paternal Grandmother         voice box        EXAM:Vitals: /66 (BP Location: Left arm, Patient Position: Sitting, Cuff Size: Adult Regular)   Ht 1.64 m (5' 4.57\")   Wt 64.6 kg (142 lb 8 oz)   LMP 2022 (Exact Date)   BMI 24.03 kg/m    BMI= Body mass index is 24.03 kg/m .    General appearance: Patient is alert and fully cooperative with history & exam.  No sign of distress is noted during the visit.     Psychiatric: Affect is pleasant & appropriate.  Patient appears motivated to improve health.     Respiratory: Breathing is regular & unlabored while sitting.     HEENT: Hearing is intact to spoken word.  Speech is clear.  No gross evidence of visual impairment that would impact ambulation.     Vascular: DP & PT pulses are intact & regular bilaterally.  No significant edema or varicosities noted.  CFT and skin temperature is normal to both lower extremities.     Neurologic: Lower extremity sensation is intact to light touch.  No evidence of weakness or contracture in the lower extremities.  No evidence of neuropathy.    Dermatologic: Skin is intact to both lower extremities with adequate texture, turgor and tone about the integument.  No paronychia or evidence of soft tissue infection is noted.     Musculoskeletal: Patient is ambulatory without assistive device or brace.  Upon first evaluation her foot looks fairly rectus however upon loading her foot significant valgus is noted with the foot lateral to the leg and mild tibial varum.  There is no crepitus or tracking through range of motion of the rear foot or ankle however there is clearly discomfort noted with firm palpation " about the posterior left ankle left subtalar joint in the area of the os trigonum.  This is not noted on the right.  No peroneal subluxation is noted but there is subtle discomfort with firm palpation at the insertion of the Achilles tendon upon the calcaneus.    Outside MRI and bilateral ankle x-ray demonstrates os trigonum on the left.  Subtle synovitis is noted about the anterior and posterior left ankle.     ASSESSMENT:       ICD-10-CM    1. 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      5/12/2022   Interpreted CT scan written results  Recommended a custom molded orthotic and appropriate training techniques and discussed etiology and treatment options regarding shinsplints.  May follow-up if this remains symptomatic.    8/22/2022  Discussed treatment options with her she is utilizing orthotics appropriate shoe gear and this continues to limit her activity especially with discomfort about the posterior left ankle.  She would like to have the accessory ossicle removed.  Imaging from contralateral ankle demonstrates no accessory ossicle on the right only on the left.  MRI demonstrates some synovitis about the left ankle anterior and posterior very consistent with os trigonum syndrome.  We discussed the postoperative course and treatment options.  We discussed potential risk and complications all questions were answered.    She would like to discuss this further with her family to determine if she would like to do this here versus in [place] near where she is going to school.  We discussed the pros and cons both ways and she will call to schedule this if she would like to do this here in [place].    Jose Garza DPM      Patient will call if she would like to move forward with surgery here versus at [facility].    Surgery:  Patient Name:  Juana Bacon ([id number])  Procedure:    Excision os trigonum left ankle  Diagnosis:    Painful os trigonum syndrome left  ankle  Assistant: first assist  Surgeon:  Jose Garza DPM  Anesthesia:  General  PT type:  Same Day Surgery  Time needed: 75 minutes  Patient position:  right lateral recumbant and with castillo bag  Mini fluoro:  No  C-arm:  no  Equipment: Soft tissue instruments to remove accessory ossicle posterior talus  Anticoagulation:  No  Vendor:  no  Surgeon Notes: Student at [employer]    Post op appts:    5-7 days po  12-15 days po  approx 4 weeks  Some flexibility to these times as she is a student at [employer].    Expected work restrictions:  no weight bearing for about 2 weeks with some restrictions for about 1 month    [facility] Discussed:  NO    Urgency to Schedule: Patient will call to schedule.  She is a student at [employer]                    Again, thank you for allowing me to participate in the care of your patient.        Sincerely,        Jose Garza DPM

## 2022-08-22 NOTE — PROGRESS NOTES
Chief Complaint   Patient presents with     RECHECK     Presents with posterior Right achilles pain after obtaining orthotics and new shoes - Right medial tibial stress syndrome and pes valgus; MRI L ankle and XR B/L ankle 2022; LOV      HPI:  Juana Bacon is a 21 year old female who is seen in consultation at the request of self.    Pt presents for eval of:   (Onset, Location, L/R, Character, Treatments, Injury if yes)     Onset 2021, left and right ankle pain that radiates to feet and lower legs. History of shin splints heather right.  Constant swelling. Intermittent, throbbing, dull ache, pain 5-9/10, worse with running.    Massage, taping, compression sleeves, different cleats  Had os trigonum injection left ankle April and gave good results for 2 months.   Today wondering what she would have to do to go through this procedure and whether she should do this here or at [facility].  Her ankle remains painful now limiting her physical activities.    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    Patient Active Problem List   Diagnosis     Exercise-induced asthma     Excessive or frequent menstruation       PAST MEDICAL HISTORY:   Past Medical History:   Diagnosis Date     Exercise-induced asthma      Hydronephrosis, right 2017     Unspecified fetal and  jaundice     treated with phototherapy in the  period     Volume depletion     hospitalized at 7 mo for dehydration with Rotavirus        PAST SURGICAL HISTORY:   Past Surgical History:   Procedure Laterality Date     EXCISE MASS FOOT  2013    Procedure: EXCISE MASS FOOT;  Excise soft tissue mass left foot;  Surgeon: Mick Walden DPM;  Location: [facility] OR     NO HISTORY OF SURGERY          MEDICATIONS:   Current Outpatient Medications:      AJOVY SOSY subcutaneous, ,  Disp: , Rfl:      albuterol (PROAIR HFA/PROVENTIL HFA/VENTOLIN HFA) 108 (90 Base) MCG/ACT inhaler, Inhale 2 puffs into the lungs every 6 hours, Disp: 18 g, Rfl: 4     naproxen (NAPROSYN) 500 MG tablet, TK 1 T PO BID PRF HA, Disp: , Rfl:      norgestimate-ethinyl estradiol (ORTHO-CYCLEN) 0.25-35 MG-MCG tablet, Take 1 tablet by mouth daily, Disp: 84 tablet, Rfl: 3     polyethylene glycol (MIRALAX) 17 GM/Dose powder, Take 17 g (1 capful) by mouth daily, Disp: 510 g, Rfl: 1     ALLERGIES:    Allergies   Allergen Reactions     Amoxicillin Hives        SOCIAL HISTORY:   Social History     Socioeconomic History     Marital status: Single     Spouse name: Not on file     Number of children: Not on file     Years of education: Not on file     Highest education level: Not on file   Occupational History     Not on file   Tobacco Use     Smoking status: Never Smoker     Smokeless tobacco: Never Used     Tobacco comment: No smokers in the home   Vaping Use     Vaping Use: Never used   Substance and Sexual Activity     Alcohol use: No     Drug use: No     Sexual activity: Yes     Partners: Male     Birth control/protection: Pill   Other Topics Concern     Parent/sibling w/ CABG, MI or angioplasty before 65F 55M? No   Social History Narrative     Not on file     Social Determinants of Health     Financial Resource Strain: Not on file   Food Insecurity: Not on file   Transportation Needs: Not on file   Physical Activity: Not on file   Stress: Not on file   Social Connections: Not on file   Intimate Partner Violence: Not on file   Housing Stability: Unknown     Unable to Pay for Housing in the Last Year: No     Number of Places Lived in the Last Year: Not on file     Unstable Housing in the Last Year: No        FAMILY HISTORY:   Family History   Problem Relation Age of Onset     Congenital Anomalies Sister          at 3 days old from complications of intrauterine chicken pox     Diabetes Paternal Grandfather      Cancer Paternal  "Grandfather         kidney     Hypertension Paternal Grandfather      Cancer Paternal Grandmother         voice box        EXAM:Vitals: /66 (BP Location: Left arm, Patient Position: Sitting, Cuff Size: Adult Regular)   Ht 1.64 m (5' 4.57\")   Wt 64.6 kg (142 lb 8 oz)   LMP 07/20/2022 (Exact Date)   BMI 24.03 kg/m    BMI= Body mass index is 24.03 kg/m .    General appearance: Patient is alert and fully cooperative with history & exam.  No sign of distress is noted during the visit.     Psychiatric: Affect is pleasant & appropriate.  Patient appears motivated to improve health.     Respiratory: Breathing is regular & unlabored while sitting.     HEENT: Hearing is intact to spoken word.  Speech is clear.  No gross evidence of visual impairment that would impact ambulation.     Vascular: DP & PT pulses are intact & regular bilaterally.  No significant edema or varicosities noted.  CFT and skin temperature is normal to both lower extremities.     Neurologic: Lower extremity sensation is intact to light touch.  No evidence of weakness or contracture in the lower extremities.  No evidence of neuropathy.    Dermatologic: Skin is intact to both lower extremities with adequate texture, turgor and tone about the integument.  No paronychia or evidence of soft tissue infection is noted.     Musculoskeletal: Patient is ambulatory without assistive device or brace.  Upon first evaluation her foot looks fairly rectus however upon loading her foot significant valgus is noted with the foot lateral to the leg and mild tibial varum.  There is no crepitus or tracking through range of motion of the rear foot or ankle however there is clearly discomfort noted with firm palpation about the posterior left ankle left subtalar joint in the area of the os trigonum.  This is not noted on the right.  No peroneal subluxation is noted but there is subtle discomfort with firm palpation at the insertion of the Achilles tendon upon the " calcaneus.    Outside MRI and bilateral ankle x-ray demonstrates os trigonum on the left.  Subtle synovitis is noted about the anterior and posterior left ankle.     ASSESSMENT:       ICD-10-CM    1. 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      5/12/2022   Interpreted CT scan written results  Recommended a custom molded orthotic and appropriate training techniques and discussed etiology and treatment options regarding shinsplints.  May follow-up if this remains symptomatic.    8/22/2022  Discussed treatment options with her she is utilizing orthotics appropriate shoe gear and this continues to limit her activity especially with discomfort about the posterior left ankle.  She would like to have the accessory ossicle removed.  Imaging from contralateral ankle demonstrates no accessory ossicle on the right only on the left.  MRI demonstrates some synovitis about the left ankle anterior and posterior very consistent with os trigonum syndrome.  We discussed the postoperative course and treatment options.  We discussed potential risk and complications all questions were answered.    She would like to discuss this further with her family to determine if she would like to do this here versus in [place] near where she is going to school.  We discussed the pros and cons both ways and she will call to schedule this if she would like to do this here in [place].    Patient called back the next day requesting to move forward with surgery.    Jose Garza DPM      Patient will call if she would like to move forward with surgery here versus at [facility].    Surgery:  Patient Name:  Juana Bacon ([id number])  Procedure:    Excision os trigonum left ankle  Diagnosis:    Painful os trigonum syndrome left ankle  Assistant: first assist  Surgeon:  Jose Garza DPM  Anesthesia:  General  PT type:  Same Day Surgery  Time needed: 75 minutes  Patient position:  right lateral  recumbant and with bean bag  Mini fluoro:  No  C-arm:  no  Equipment: Soft tissue instruments to remove accessory ossicle posterior talus  Anticoagulation:  No  Vendor:  no  Surgeon Notes: Student at [employer]    Post op appts:    5-7 days po  12-15 days po  approx 4 weeks  Some flexibility to these times as she is a student at [employer].    Expected work restrictions:  no weight bearing for about 2 weeks with some restrictions for about 1 month    [facility] Discussed:  NO    Urgency to Schedule: Patient will call to schedule.  She is a student at [employer]

## 2022-08-22 NOTE — TELEPHONE ENCOUNTER
Reason for Call:  Other call back    Detailed comments: pt seen today- trying to decide if she wants to do procedure discussed at visit here with Kayla or in [place] where she goes to [employer] [facility]. [place] can get her in the 2nd week of October. If she can get in before that here she would like to schedule procedure here with Dr. Garza     Phone Number Patient can be reached at: Home number on file [phone] (home)    Best Time: any    Can we leave a detailed message on this number? YES    Call taken on 8/22/2022 at 2:19 PM by Katherin Conner

## 2022-08-23 NOTE — TELEPHONE ENCOUNTER
Type of surgery:Excision os trigonum left ankle  Location of surgery: [facility]  Date and time of surgery: 9/16  Surgeon: Kayla  Pre-Op Appt Date: [facility]  Post-Op Appt Date: 9/22, pt will do last 2 postops at [facility]   Packet sent out: Yes via [employer]  Pre-cert/Authorization completed:  Not Applicable  Date: na

## 2022-09-09 ENCOUNTER — MYC MEDICAL ADVICE (OUTPATIENT)
Dept: FAMILY MEDICINE | Facility: CLINIC | Age: 22
End: 2022-09-09

## 2022-09-09 DIAGNOSIS — N94.6 DYSMENORRHEA: ICD-10-CM

## 2022-09-09 NOTE — TELEPHONE ENCOUNTER
Birth control was sent to the pharmacy on 08/09/2022 for #84 with 3 refills.      She is asking to miss the placebo week.  Will need sig changed for this.    Will route to provider for response.    Joseph Orlando, GAGEN, RN, PHN  [facility] ~ Registered Nurse  Clinic Triage ~ [facility]  September 9, 2022

## 2022-09-12 RX ORDER — NORGESTIMATE AND ETHINYL ESTRADIOL 0.25-0.035
1 KIT ORAL DAILY
Qty: 112 TABLET | Refills: 3 | Status: SHIPPED | OUTPATIENT
Start: 2022-09-12 | End: 2023-10-26

## 2022-09-15 ENCOUNTER — MYC MEDICAL ADVICE (OUTPATIENT)
Dept: PODIATRY | Facility: CLINIC | Age: 22
End: 2022-09-15

## 2022-09-15 ENCOUNTER — TELEPHONE (OUTPATIENT)
Dept: PODIATRY | Facility: CLINIC | Age: 22
End: 2022-09-15

## 2022-09-15 NOTE — TELEPHONE ENCOUNTER
Spoke with Gabby Hooks MA who states that Dr. Garza has advised that surgery needs to be rescheduled as proceeding would put patient at risk for developing pneumonia.     Patient notified of provider's message as written above. Patient verbalized understanding and has no further questions at this time.     Please call and assist patient in rescheduling procedure.     Reanna Spencer RN   [facility]

## 2022-09-15 NOTE — TELEPHONE ENCOUNTER
David Wong PA-C calling from [facility] and [facility] ([facility] in [place], [place]. Patient is scheduled for an Excision of trigonum of the left ankle 9/16/22. David completed patient's pre-op physical 9/14/22 (records available in Care Everywhere, but note has not yet been signed) and wanted to make Dr. Garza aware that while patient's COVID was negative, she was diagnosed with bacterial pharyngitis three days ago. She is currently on antibiotics, but there was still some exudate and swelling present to patient's throat upon exam yesterday. Please advise. Ok to proceed with procedure as scheduled or will it need to be delayed?    Reanna Spencer RN   [facility]

## 2022-09-15 NOTE — TELEPHONE ENCOUNTER
Due to infection, surgery must be rescheduled per Dr. Garza.    Spoke to patient, rescheduled for 9/30  She still declined to schedule further postops.

## 2022-09-29 ENCOUNTER — ANESTHESIA EVENT (OUTPATIENT)
Dept: SURGERY | Facility: CLINIC | Age: 22
End: 2022-09-29
Payer: COMMERCIAL

## 2022-09-29 NOTE — ANESTHESIA PREPROCEDURE EVALUATION
Anesthesia Pre-Procedure Evaluation    Patient: Juaan Bacon   MRN: [id number] : 2000        Procedure : Procedure(s):  Excision os trigonum left ankle          Past Medical History:   Diagnosis Date     Exercise-induced asthma      Hydronephrosis, right 2017     Unspecified fetal and  jaundice     treated with phototherapy in the  period     Volume depletion     hospitalized at 7 mo for dehydration with Rotavirus      Past Surgical History:   Procedure Laterality Date     EXCISE MASS FOOT  2013    Procedure: EXCISE MASS FOOT;  Excise soft tissue mass left foot;  Surgeon: Mick Walden DPM;  Location: [facility] OR     NO HISTORY OF SURGERY        Allergies   Allergen Reactions     Amoxicillin Hives      Social History     Tobacco Use     Smoking status: Never Smoker     Smokeless tobacco: Never Used     Tobacco comment: No smokers in the home   Substance Use Topics     Alcohol use: No      Wt Readings from Last 1 Encounters:   22 64.6 kg (142 lb 8 oz) (72 %, Z= 0.59)*     * Growth percentiles are based on [facility] (Girls, 2-20 Years) data.        Anesthesia Evaluation   Pt has not had prior anesthetic         ROS/MED HX  ENT/Pulmonary: Comment: Bacterial pharyngitis... patient has been on  ABX for 5 days and symptoms have currently subsided.  No SOB no cough, no sore throat at this time.     Exercise induced asthma     (+) Mild Persistent, asthma Treatment: Inhaler prn,      Neurologic:     (+) migraines,     Cardiovascular:  - neg cardiovascular ROS     METS/Exercise Tolerance:     Hematologic:  - neg hematologic  ROS     Musculoskeletal: Comment: Mass on foot      GI/Hepatic:  - neg GI/hepatic ROS     Renal/Genitourinary:       Endo:  - neg endo ROS     Psychiatric/Substance Use:  - neg psychiatric ROS     Infectious Disease:  - neg infectious disease ROS     Malignancy:  - neg malignancy ROS     Other:  - neg other ROS          Physical Exam    Airway  airway exam normal       Mallampati: II   TM distance: > 3 FB   Neck ROM: full   Mouth opening: > 3 cm    Respiratory Devices and Support         Dental  no notable dental history         Cardiovascular   cardiovascular exam normal       Rhythm and rate: regular and normal     Pulmonary   pulmonary exam normal        breath sounds clear to auscultation           OUTSIDE LABS:  CBC:   Lab Results   Component Value Date    WBC 6.3 10/04/2018    WBC 4.5 04/17/2017    HGB 12.9 10/04/2018    HGB 14.4 04/17/2017    HCT 37.4 10/04/2018    HCT 41.4 04/17/2017     10/04/2018     04/17/2017     BMP:   Lab Results   Component Value Date     01/12/2017    POTASSIUM 4.2 01/12/2017    CHLORIDE 102 01/12/2017    CO2 34 (H) 01/12/2017    BUN 13 01/12/2017    CR 0.70 01/12/2017     (H) 01/12/2017     COAGS:   Lab Results   Component Value Date    PTT 35 04/17/2017    INR 1.07 04/17/2017     POC: No results found for: BGM, HCG, HCGS  HEPATIC:   Lab Results   Component Value Date    ALBUMIN 3.6 01/12/2017    PROTTOTAL 8.1 01/12/2017    ALT 15 01/12/2017    AST 11 01/12/2017    ALKPHOS 80 01/12/2017    BILITOTAL 0.3 01/12/2017     OTHER:   Lab Results   Component Value Date    SARAH 9.2 01/12/2017    TSH 1.56 10/04/2018    T4 1.03 10/04/2018    CRP <2.9 10/04/2018    SED 9 10/04/2018       Anesthesia Plan    ASA Status:  1   NPO Status:  NPO Appropriate    Anesthesia Type: General.     - Airway: ETT   Induction: Intravenous, Propofol.   Maintenance: Balanced.        Consents    Anesthesia Plan(s) and associated risks, benefits, and realistic alternatives discussed. Questions answered and patient/representative(s) expressed understanding.    - Discussed:     - Discussed with:  Patient      - Extended Intubation/Ventilatory Support Discussed: No.      - Patient is DNR/DNI Status: No    Use of blood products discussed: No .     Postoperative Care    Pain management: Oral pain medications.   PONV prophylaxis: Ondansetron (or other  5HT-3), Dexamethasone or Solumedrol, Background Propofol Infusion     Comments:    Other Comments: The risks and benefits of anesthesia, and the alternatives where applicable, have been discussed with the patient, and they wish to proceed.            VIKI Black CRNA

## 2022-09-30 ENCOUNTER — ANESTHESIA (OUTPATIENT)
Dept: SURGERY | Facility: CLINIC | Age: 22
End: 2022-09-30
Payer: COMMERCIAL

## 2022-09-30 ENCOUNTER — HOSPITAL ENCOUNTER (OUTPATIENT)
Facility: CLINIC | Age: 22
Discharge: HOME | End: 2022-09-30
Attending: PODIATRIST | Admitting: PODIATRIST
Payer: COMMERCIAL

## 2022-09-30 ENCOUNTER — APPOINTMENT (OUTPATIENT)
Dept: GENERAL RADIOLOGY | Facility: CLINIC | Age: 22
End: 2022-09-30
Attending: PODIATRIST
Payer: COMMERCIAL

## 2022-09-30 VITALS
OXYGEN SATURATION: 97 % | HEART RATE: 88 BPM | SYSTOLIC BLOOD PRESSURE: 116 MMHG | RESPIRATION RATE: 16 BRPM | TEMPERATURE: 98.6 F | DIASTOLIC BLOOD PRESSURE: 68 MMHG

## 2022-09-30 DIAGNOSIS — Q68.8 OS TRIGONUM SYNDROME: ICD-10-CM

## 2022-09-30 DIAGNOSIS — J45.990 EXERCISE-INDUCED ASTHMA: ICD-10-CM

## 2022-09-30 DIAGNOSIS — N92.0 EXCESSIVE OR FREQUENT MENSTRUATION: Primary | ICD-10-CM

## 2022-09-30 PROCEDURE — 370N000017 HC ANESTHESIA TECHNICAL FEE, PER MIN: Performed by: PODIATRIST

## 2022-09-30 PROCEDURE — 258N000003 HC RX IP 258 OP 636: Performed by: NURSE ANESTHETIST, CERTIFIED REGISTERED

## 2022-09-30 PROCEDURE — 250N000026 HC DESFLURANE, PER MIN: Performed by: PODIATRIST

## 2022-09-30 PROCEDURE — 272N000001 HC OR GENERAL SUPPLY STERILE: Performed by: PODIATRIST

## 2022-09-30 PROCEDURE — 710N000012 HC RECOVERY PHASE 2, PER MINUTE: Performed by: PODIATRIST

## 2022-09-30 PROCEDURE — 250N000025 HC SEVOFLURANE, PER MIN: Performed by: PODIATRIST

## 2022-09-30 PROCEDURE — 250N000009 HC RX 250: Performed by: NURSE ANESTHETIST, CERTIFIED REGISTERED

## 2022-09-30 PROCEDURE — 250N000009 HC RX 250: Performed by: PODIATRIST

## 2022-09-30 PROCEDURE — 999N000141 HC STATISTIC PRE-PROCEDURE NURSING ASSESSMENT: Performed by: PODIATRIST

## 2022-09-30 PROCEDURE — 250N000013 HC RX MED GY IP 250 OP 250 PS 637: Performed by: NURSE ANESTHETIST, CERTIFIED REGISTERED

## 2022-09-30 PROCEDURE — 710N000010 HC RECOVERY PHASE 1, LEVEL 2, PER MIN: Performed by: PODIATRIST

## 2022-09-30 PROCEDURE — 250N000013 HC RX MED GY IP 250 OP 250 PS 637: Performed by: PODIATRIST

## 2022-09-30 PROCEDURE — 250N000011 HC RX IP 250 OP 636: Performed by: NURSE ANESTHETIST, CERTIFIED REGISTERED

## 2022-09-30 PROCEDURE — 250N000011 HC RX IP 250 OP 636: Performed by: PODIATRIST

## 2022-09-30 PROCEDURE — 360N000076 HC SURGERY LEVEL 3, PER MIN: Performed by: PODIATRIST

## 2022-09-30 PROCEDURE — 999N000179 XR SURGERY CARM FLUORO LESS THAN 5 MIN W STILLS

## 2022-09-30 PROCEDURE — 28120 PART REMOVAL OF ANKLE/HEEL: CPT | Mod: LT | Performed by: PODIATRIST

## 2022-09-30 RX ORDER — FENTANYL CITRATE 50 UG/ML
INJECTION, SOLUTION INTRAMUSCULAR; INTRAVENOUS PRN
Status: DISCONTINUED | OUTPATIENT
Start: 2022-09-30 | End: 2022-09-30

## 2022-09-30 RX ORDER — HYDROXYZINE HYDROCHLORIDE 25 MG/1
25 TABLET, FILM COATED ORAL
Status: DISCONTINUED | OUTPATIENT
Start: 2022-09-30 | End: 2022-09-30 | Stop reason: HOSPADM

## 2022-09-30 RX ORDER — OXYCODONE HYDROCHLORIDE 5 MG/1
5 TABLET ORAL EVERY 4 HOURS PRN
Qty: 20 TABLET | Refills: 0 | Status: SHIPPED | OUTPATIENT
Start: 2022-09-30 | End: 2023-05-25

## 2022-09-30 RX ORDER — DEXAMETHASONE SODIUM PHOSPHATE 10 MG/ML
INJECTION, SOLUTION INTRAMUSCULAR; INTRAVENOUS PRN
Status: DISCONTINUED | OUTPATIENT
Start: 2022-09-30 | End: 2022-09-30

## 2022-09-30 RX ORDER — CEFAZOLIN SODIUM/WATER 2 G/20 ML
2 SYRINGE (ML) INTRAVENOUS
Status: COMPLETED | OUTPATIENT
Start: 2022-09-30 | End: 2022-09-30

## 2022-09-30 RX ORDER — DIMENHYDRINATE 50 MG/ML
INJECTION, SOLUTION INTRAMUSCULAR; INTRAVENOUS PRN
Status: DISCONTINUED | OUTPATIENT
Start: 2022-09-30 | End: 2022-09-30

## 2022-09-30 RX ORDER — OXYCODONE HYDROCHLORIDE 5 MG/1
5 TABLET ORAL
Status: DISCONTINUED | OUTPATIENT
Start: 2022-09-30 | End: 2022-09-30 | Stop reason: HOSPADM

## 2022-09-30 RX ORDER — ONDANSETRON 4 MG/1
4 TABLET, ORALLY DISINTEGRATING ORAL EVERY 30 MIN PRN
Status: DISCONTINUED | OUTPATIENT
Start: 2022-09-30 | End: 2022-09-30 | Stop reason: HOSPADM

## 2022-09-30 RX ORDER — DIMENHYDRINATE 50 MG/ML
25 INJECTION, SOLUTION INTRAMUSCULAR; INTRAVENOUS
Status: DISCONTINUED | OUTPATIENT
Start: 2022-09-30 | End: 2022-09-30 | Stop reason: HOSPADM

## 2022-09-30 RX ORDER — BUPIVACAINE HYDROCHLORIDE 5 MG/ML
INJECTION, SOLUTION PERINEURAL PRN
Status: DISCONTINUED | OUTPATIENT
Start: 2022-09-30 | End: 2022-09-30 | Stop reason: HOSPADM

## 2022-09-30 RX ORDER — ONDANSETRON 2 MG/ML
INJECTION INTRAMUSCULAR; INTRAVENOUS PRN
Status: DISCONTINUED | OUTPATIENT
Start: 2022-09-30 | End: 2022-09-30

## 2022-09-30 RX ORDER — PROPOFOL 10 MG/ML
INJECTION, EMULSION INTRAVENOUS CONTINUOUS PRN
Status: DISCONTINUED | OUTPATIENT
Start: 2022-09-30 | End: 2022-09-30

## 2022-09-30 RX ORDER — MEPERIDINE HYDROCHLORIDE 25 MG/ML
12.5 INJECTION INTRAMUSCULAR; INTRAVENOUS; SUBCUTANEOUS
Status: DISCONTINUED | OUTPATIENT
Start: 2022-09-30 | End: 2022-09-30 | Stop reason: HOSPADM

## 2022-09-30 RX ORDER — HYDROMORPHONE HYDROCHLORIDE 1 MG/ML
0.4 INJECTION, SOLUTION INTRAMUSCULAR; INTRAVENOUS; SUBCUTANEOUS EVERY 5 MIN PRN
Status: DISCONTINUED | OUTPATIENT
Start: 2022-09-30 | End: 2022-09-30 | Stop reason: HOSPADM

## 2022-09-30 RX ORDER — SODIUM CHLORIDE, SODIUM LACTATE, POTASSIUM CHLORIDE, CALCIUM CHLORIDE 600; 310; 30; 20 MG/100ML; MG/100ML; MG/100ML; MG/100ML
INJECTION, SOLUTION INTRAVENOUS CONTINUOUS
Status: DISCONTINUED | OUTPATIENT
Start: 2022-09-30 | End: 2022-09-30 | Stop reason: HOSPADM

## 2022-09-30 RX ORDER — ONDANSETRON 4 MG/1
4 TABLET, ORALLY DISINTEGRATING ORAL
Status: DISCONTINUED | OUTPATIENT
Start: 2022-09-30 | End: 2022-09-30 | Stop reason: HOSPADM

## 2022-09-30 RX ORDER — LIDOCAINE HYDROCHLORIDE 20 MG/ML
INJECTION, SOLUTION INFILTRATION; PERINEURAL PRN
Status: DISCONTINUED | OUTPATIENT
Start: 2022-09-30 | End: 2022-09-30

## 2022-09-30 RX ORDER — METHOCARBAMOL 750 MG/1
750 TABLET, FILM COATED ORAL
Status: COMPLETED | OUTPATIENT
Start: 2022-09-30 | End: 2022-09-30

## 2022-09-30 RX ORDER — FENTANYL CITRATE 50 UG/ML
50 INJECTION, SOLUTION INTRAMUSCULAR; INTRAVENOUS EVERY 5 MIN PRN
Status: DISCONTINUED | OUTPATIENT
Start: 2022-09-30 | End: 2022-09-30 | Stop reason: HOSPADM

## 2022-09-30 RX ORDER — ONDANSETRON 2 MG/ML
4 INJECTION INTRAMUSCULAR; INTRAVENOUS EVERY 30 MIN PRN
Status: DISCONTINUED | OUTPATIENT
Start: 2022-09-30 | End: 2022-09-30 | Stop reason: HOSPADM

## 2022-09-30 RX ORDER — PROPOFOL 10 MG/ML
INJECTION, EMULSION INTRAVENOUS PRN
Status: DISCONTINUED | OUTPATIENT
Start: 2022-09-30 | End: 2022-09-30

## 2022-09-30 RX ORDER — CEFAZOLIN SODIUM/WATER 2 G/20 ML
2 SYRINGE (ML) INTRAVENOUS SEE ADMIN INSTRUCTIONS
Status: DISCONTINUED | OUTPATIENT
Start: 2022-09-30 | End: 2022-09-30 | Stop reason: HOSPADM

## 2022-09-30 RX ORDER — LIDOCAINE 40 MG/G
CREAM TOPICAL
Status: DISCONTINUED | OUTPATIENT
Start: 2022-09-30 | End: 2022-09-30 | Stop reason: HOSPADM

## 2022-09-30 RX ORDER — FENTANYL CITRATE 50 UG/ML
50 INJECTION, SOLUTION INTRAMUSCULAR; INTRAVENOUS
Status: DISCONTINUED | OUTPATIENT
Start: 2022-09-30 | End: 2022-09-30 | Stop reason: HOSPADM

## 2022-09-30 RX ORDER — OXYCODONE HYDROCHLORIDE 5 MG/1
5 TABLET ORAL EVERY 4 HOURS PRN
Status: DISCONTINUED | OUTPATIENT
Start: 2022-09-30 | End: 2022-09-30 | Stop reason: HOSPADM

## 2022-09-30 RX ADMIN — SODIUM CHLORIDE, POTASSIUM CHLORIDE, SODIUM LACTATE AND CALCIUM CHLORIDE: 600; 310; 30; 20 INJECTION, SOLUTION INTRAVENOUS at 08:03

## 2022-09-30 RX ADMIN — LIDOCAINE HYDROCHLORIDE 60 MG: 20 INJECTION, SOLUTION INFILTRATION; PERINEURAL at 07:40

## 2022-09-30 RX ADMIN — FENTANYL CITRATE 50 MCG: 50 INJECTION, SOLUTION INTRAMUSCULAR; INTRAVENOUS at 07:40

## 2022-09-30 RX ADMIN — PROPOFOL 200 MG: 10 INJECTION, EMULSION INTRAVENOUS at 07:40

## 2022-09-30 RX ADMIN — LIDOCAINE HYDROCHLORIDE 1 ML: 10 INJECTION, SOLUTION EPIDURAL; INFILTRATION; INTRACAUDAL; PERINEURAL at 06:45

## 2022-09-30 RX ADMIN — HYDROMORPHONE HYDROCHLORIDE 0.5 MG: 1 INJECTION, SOLUTION INTRAMUSCULAR; INTRAVENOUS; SUBCUTANEOUS at 08:33

## 2022-09-30 RX ADMIN — DEXAMETHASONE SODIUM PHOSPHATE 5 MG: 10 INJECTION, SOLUTION INTRAMUSCULAR; INTRAVENOUS at 08:04

## 2022-09-30 RX ADMIN — MIDAZOLAM 2 MG: 1 INJECTION INTRAMUSCULAR; INTRAVENOUS at 07:37

## 2022-09-30 RX ADMIN — ROCURONIUM BROMIDE 50 MG: 50 INJECTION, SOLUTION INTRAVENOUS at 07:40

## 2022-09-30 RX ADMIN — SODIUM CHLORIDE, POTASSIUM CHLORIDE, SODIUM LACTATE AND CALCIUM CHLORIDE: 600; 310; 30; 20 INJECTION, SOLUTION INTRAVENOUS at 06:46

## 2022-09-30 RX ADMIN — Medication 2 G: at 07:37

## 2022-09-30 RX ADMIN — SUGAMMADEX 200 MG: 100 INJECTION, SOLUTION INTRAVENOUS at 09:34

## 2022-09-30 RX ADMIN — ONDANSETRON 4 MG: 2 INJECTION INTRAMUSCULAR; INTRAVENOUS at 09:23

## 2022-09-30 RX ADMIN — FENTANYL CITRATE 50 MCG: 50 INJECTION, SOLUTION INTRAMUSCULAR; INTRAVENOUS at 07:37

## 2022-09-30 RX ADMIN — HYDROMORPHONE HYDROCHLORIDE 0.5 MG: 1 INJECTION, SOLUTION INTRAMUSCULAR; INTRAVENOUS; SUBCUTANEOUS at 07:59

## 2022-09-30 RX ADMIN — OXYCODONE HYDROCHLORIDE 5 MG: 5 TABLET ORAL at 11:28

## 2022-09-30 RX ADMIN — PROPOFOL 150 MCG/KG/MIN: 10 INJECTION, EMULSION INTRAVENOUS at 07:44

## 2022-09-30 RX ADMIN — DIMENHYDRINATE 25 MG: 50 INJECTION, SOLUTION INTRAMUSCULAR; INTRAVENOUS at 08:05

## 2022-09-30 RX ADMIN — METHOCARBAMOL 750 MG: 750 TABLET, FILM COATED ORAL at 11:58

## 2022-09-30 ASSESSMENT — ACTIVITIES OF DAILY LIVING (ADL)
ADLS_ACUITY_SCORE: 37
ADLS_ACUITY_SCORE: 35
ADLS_ACUITY_SCORE: 35

## 2022-09-30 NOTE — BRIEF OP NOTE
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

## 2022-09-30 NOTE — OP NOTE
Procedure Date: 09/30/2022    SURGEON:  Jose Garza DPM    ASSISTANT:  SCOT Quarles    PREOPERATIVE DIAGNOSIS:  Painful os trigonum syndrome, left ankle.    POSTOPERATIVE DIAGNOSIS:  Painful os trigonum syndrome, left ankle.    PLANNED PROCEDURE:  Excision os trigonum, left ankle.    DESCRIPTION OF PROCEDURE:  In the preoperative holding area, informed consent was obtained.  We discussed potential risks, complications with the patient and mother.  No guarantees were given or implied.  We reviewed the postoperative cares, as well as alternative treatment.  No contraindications were noted to the surgery.  She wishes to move forward with the surgery and surgical treatment.  She was brought to the operating room and placed on the operating table in a supine position.  She was then given general anesthesia by the Anesthesia department.  The beanbag had been previously placed under her and she was placed in the right lateral recumbent to allow her left extremity to be exposed laterally and a left thigh tourniquet was applied after copious amounts of Webril padding.  The left leg was prepped and draped in the usual sterile fashion.  Timeout was performed.  The left lower extremity was exsanguinated with an Esmarch.  The cuff was inflated to 350 mmHg.  A 15 blade was then utilized to make a 6 cm incision over the dorsal lateral aspect of the ankle, starting from the distal margin of the fibula about 6 cm proximal, splitting the distance between the posterior peroneal tendons and the Achilles tendon.  Small areas of bleeding were cauterized utilizing a Bovie and 3-0 hand ties were utilized to tie one vein,.  the sural nerve was protected throughout the procedure.  With [employer] retractors and a large rake retractor Metzenbaum scissors was utilized to continue the depth of the dissection.  Peroneal tendons were protected throughout with a rake and [employer] retractor.  Posterior subtalar joint and ankle joint was  identified and the flexor hallucis longus tendon was also identified about the posterior ankle and subtalar joint.  A large accessory ossicle measuring about 7-9 mm noted about the posterior lateral talus just lateral to the extensor hallucis longus tendon and this was noted to be fractured from the talus itself and appeared to be a nonunion as the surface was quite flat as coordinated with the posterior talus and cancellous bone was noted in this region upon removing it.  It was noted to be loose.  Upon first encountering this and mobile and heavy poorly organized soft tissue was surrounding anchoring it to the posterior talus and joint capsule.  These tissues about the posterior joint capsule appeared to be very inflamed and quite vascular in nature.  The extensor hallucis longus tendon was identified after excising the accessory ossicle and no obvious disruption of the tendon or the parallel fibers of the tendon were noted.  The ankle was placed in dorsiflexion and plantarflexion and the hallux was dorsiflexed and plantarflexed and throughout all portions of the movement and through the tendon that could be visualized and no dystrophic tendon was identified.  A bell rasp was then utilized to remove any prominence about the posterior talus.  The tourniquet was released and copious amounts of sterile saline were flushed through the wound.  Fluoroscan was utilized to visualize the posterior talus and no further bony prominence was noted.  The deep space was closed utilizing a 3-0 Vicryl, being careful to protect the vital structures an the posterior capsule was closed with a 3-0 Vicryl as best possible.  Skin was closed with 4-0 4-0 Prolene and this closure was performed with the tourniquet released after just more than 60 minutes.  Adaptic and a bolster compression dressing and posterior splint was then applied.  The patient tolerated the procedure and anesthesia well and was brought back to the recovery room with  vital signs stable and vascular status intact to the left lower extremity.    Jose Garza DPM        D: 2022   T: 2022   MT: [id number]    Name:     ANIBAL HOLLOWAY  MRN:      [id number]        Account:        [id number]   :      2000           Procedure Date: 2022     Document: [id number]

## 2022-09-30 NOTE — ANESTHESIA POSTPROCEDURE EVALUATION
Patient: Juana Bacon    Procedure: Procedure(s):  Excision os trigonum left ankle       Anesthesia Type:  General    Note:  Disposition: Outpatient   Postop Pain Control: Uneventful            Sign Out: Well controlled pain   PONV: No   Neuro/Psych: Uneventful            Sign Out: Acceptable/Baseline neuro status   Airway/Respiratory: Uneventful            Sign Out: Acceptable/Baseline resp. status   CV/Hemodynamics: Uneventful            Sign Out: Acceptable CV status   Other NRE: NONE   DID A NON-ROUTINE EVENT OCCUR? No    Event details/Postop Comments:  Pt was happy with anesthesia care.  No complications.  I will follow up with the pt if needed.           Last vitals:  Vitals Value Taken Time   BP 90/78 09/30/22 1045   Temp 98.6  F (37  C) 09/30/22 1048   Pulse 84 09/30/22 1047   Resp 17 09/30/22 1047   SpO2 97 % 09/30/22 1047   Vitals shown include unvalidated device data.    Electronically Signed By: VIKI Sanders CRNA  September 30, 2022  6:13 PM

## 2022-09-30 NOTE — PROGRESS NOTES
No chief complaint on file.    HPI:  Juana Bacon is a 21 year old female who is seen in consultation at the request of self.    Pt presents for eval of:   (Onset, Location, L/R, Character, Treatments, Injury if yes)     Onset 2021, left and right ankle pain that radiates to feet and lower legs. History of shin splints heather right.  Constant swelling. Intermittent, throbbing, dull ache, pain 5-9/10, worse with running.    Massage, taping, compression sleeves, different cleats  Had os trigonum injection left ankle April and gave good results for 2 months.   Today wondering what she would have to do to go through this procedure and whether she should do this here or at [facility].  Her ankle remains painful now limiting her physical activities.    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    Patient Active Problem List   Diagnosis     Exercise-induced asthma     Excessive or frequent menstruation       PAST MEDICAL HISTORY:   Past Medical History:   Diagnosis Date     Exercise-induced asthma      Hydronephrosis, right 2017     PONV (postoperative nausea and vomiting)      Unspecified fetal and  jaundice     treated with phototherapy in the  period     Volume depletion 2003    hospitalized at 7 mo for dehydration with Rotavirus        PAST SURGICAL HISTORY:   Past Surgical History:   Procedure Laterality Date     EXCISE MASS FOOT  2013    Procedure: EXCISE MASS FOOT;  Excise soft tissue mass left foot;  Surgeon: Mick aWlden DPM;  Location: [facility] OR     NO HISTORY OF SURGERY          MEDICATIONS:   Current Facility-Administered Medications:      ceFAZolin Sodium (ANCEF) injection 2 g, 2 g, Intravenous, Pre-Op/Pre-procedure x 1 dose, Jose Garza DPM     ceFAZolin Sodium (ANCEF) injection 2 g, 2 g, Intravenous, See Admin  Instructions, Jose Garza, STU     lactated ringers infusion, , Intravenous, Continuous, Frannie, Saeid, APRN CRNA, Last Rate: 25 mL/hr at 22 0646, New Bag at 22 0646     lidocaine (LMX4) kit, , Topical, Q1H PRN, Frannie, Saeid, APRN CRNA     lidocaine 1 % 1 mL, 1 mL, Other, Q1H PRN, Frannie, Saeid, APRN CRNA, 1 mL at 22 0645     sodium chloride (PF) 0.9% PF flush 3 mL, 3 mL, Intracatheter, Q8H, Frannie, Saeid, APRN CRNA     sodium chloride (PF) 0.9% PF flush 3 mL, 3 mL, Intracatheter, Q1H PRN, Frannie, Saeid, APRN CRNA     ALLERGIES:    Allergies   Allergen Reactions     Amoxicillin Hives        SOCIAL HISTORY:   Social History     Socioeconomic History     Marital status: Single     Spouse name: Not on file     Number of children: Not on file     Years of education: Not on file     Highest education level: Not on file   Occupational History     Not on file   Tobacco Use     Smoking status: Never Smoker     Smokeless tobacco: Never Used     Tobacco comment: No smokers in the home   Vaping Use     Vaping Use: Never used   Substance and Sexual Activity     Alcohol use: No     Drug use: No     Sexual activity: Yes     Partners: Male     Birth control/protection: Pill   Other Topics Concern     Parent/sibling w/ CABG, MI or angioplasty before 65F 55M? No   Social History Narrative     Not on file     Social Determinants of Health     Financial Resource Strain: Not on file   Food Insecurity: Not on file   Transportation Needs: Not on file   Physical Activity: Not on file   Stress: Not on file   Social Connections: Not on file   Intimate Partner Violence: Not on file   Housing Stability: Unknown     Unable to Pay for Housing in the Last Year: No     Number of Places Lived in the Last Year: Not on file     Unstable Housing in the Last Year: No        FAMILY HISTORY:   Family History   Problem Relation Age of Onset     Congenital Anomalies Sister          at 3 days old from  complications of intrauterine chicken pox     Diabetes Paternal Grandfather      Cancer Paternal Grandfather         kidney     Hypertension Paternal Grandfather      Cancer Paternal Grandmother         voice box        EXAM:Vitals: /74 (Cuff Size: Adult Regular)   Pulse 88   Temp 99.3  F (37.4  C) (Temporal)   Resp 16   SpO2 98%   BMI= There is no height or weight on file to calculate BMI.    General appearance: Patient is alert and fully cooperative with history & exam.  No sign of distress is noted during the visit.     Psychiatric: Affect is pleasant & appropriate.  Patient appears motivated to improve health.     Respiratory: Breathing is regular & unlabored while sitting.     HEENT: Hearing is intact to spoken word.  Speech is clear.  No gross evidence of visual impairment that would impact ambulation.     Vascular: DP & PT pulses are intact & regular bilaterally.  No significant edema or varicosities noted.  CFT and skin temperature is normal to both lower extremities.     Neurologic: Lower extremity sensation is intact to light touch.  No evidence of weakness or contracture in the lower extremities.  No evidence of neuropathy.    Dermatologic: Skin is intact to both lower extremities with adequate texture, turgor and tone about the integument.  No paronychia or evidence of soft tissue infection is noted.     Musculoskeletal: Patient is ambulatory without assistive device or brace.  Upon first evaluation her foot looks fairly rectus however upon loading her foot significant valgus is noted with the foot lateral to the leg and mild tibial varum.  There is no crepitus or tracking through range of motion of the rear foot or ankle however there is clearly discomfort noted with firm palpation about the posterior left ankle left subtalar joint in the area of the os trigonum.  This is not noted on the right.  No peroneal subluxation is noted but there is subtle discomfort with firm palpation at the  insertion of the Achilles tendon upon the calcaneus.    Outside MRI and bilateral ankle x-ray demonstrates os trigonum on the left.  Subtle synovitis is noted about the anterior and posterior left ankle.     ASSESSMENT:       ICD-10-CM    1. Os trigonum syndrome  Q68.8 Cleanse skin for procedure     Nozin Nasal  Popswab     Void on call to OR     Notify Provider - Anticoagulants and Antiplatelets     NPO per Anesthesia Guidelines for Procedure/Surgery Except for: Meds     Apply Pneumatic Compression Device (PCD)     Pneumatic Compression Device (PCD) (Equipment)     ceFAZolin Sodium (ANCEF) injection 2 g     ceFAZolin Sodium (ANCEF) injection 2 g     Cleanse skin for procedure     Nozin Nasal  Popswab     Void on call to OR     Notify Provider - Anticoagulants and Antiplatelets     NPO per Anesthesia Guidelines for Procedure/Surgery Except for: Meds     Apply Pneumatic Compression Device (PCD)     Pneumatic Compression Device (PCD) (Equipment)        PLAN:  Reviewed patient's chart in [facility].      5/12/2022   Interpreted CT scan written results  Recommended a custom molded orthotic and appropriate training techniques and discussed etiology and treatment options regarding shinsplints.  May follow-up if this remains symptomatic.    8/22/2022  Discussed treatment options with her she is utilizing orthotics appropriate shoe gear and this continues to limit her activity especially with discomfort about the posterior left ankle.  She would like to have the accessory ossicle removed.  Imaging from contralateral ankle demonstrates no accessory ossicle on the right only on the left.  MRI demonstrates some synovitis about the left ankle anterior and posterior very consistent with os trigonum syndrome.  We discussed the postoperative course and treatment options.  We discussed potential risk and complications all questions were answered.    She would like to discuss this further with her family to determine if  she would like to do this here versus in [place] near where she is going to school.  We discussed the pros and cons both ways and she will call to schedule this if she would like to do this here in [place].    Patient called back the next day requesting to move forward with surgery.    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  I discussed with the patient potential risks and complications as well as alternative treatment options and post op care requirements.   The patients clinical exam and indications are unchanged.  I answered all questions for the patient.  No guarantees were given or implied.  They wish to proceed with surgical treatment.  They have been NPO for 8 hours or more.  No contraindications to surgical treatment at this time.        Jose Garza DPM      Patient will call if she would like to move forward with surgery here versus at [facility].    Surgery:  Patient Name:  Juana Bacon ([id number])  Procedure:    Excision os trigonum left ankle  Diagnosis:    Painful os trigonum syndrome left ankle  Assistant: first assist  Surgeon:  Jose Garza DPM  Anesthesia:  General  PT type:  Same Day Surgery  Time needed: 75 minutes  Patient position:  right lateral recumbant and with castillo bag  Mini fluoro:  No  C-arm:  no  Equipment: Soft tissue instruments to remove accessory ossicle posterior talus  Anticoagulation:  No  Vendor:  no  Surgeon Notes: Student at [employer]    Post op appts:    5-7 days po  12-15 days po  approx 4 weeks  Some flexibility to these times as she is a student at [employer].    Expected work restrictions:  no weight bearing for about 2 weeks with some restrictions for about 1 month    [facility] Discussed:  NO    Urgency to Schedule: Patient will call to schedule.  She is a student at [employer]

## 2022-09-30 NOTE — ANESTHESIA CARE TRANSFER NOTE
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    Anesthesia Type:   General     Note:    Oropharynx: oropharynx clear of all foreign objects and spontaneously breathing  Level of Consciousness: drowsy  Oxygen Supplementation: face mask    Independent Airway: airway patency satisfactory and stable  Dentition: dentition unchanged  Vital Signs Stable: post-procedure vital signs reviewed and stable  Report to RN Given: handoff report given  Patient transferred to: PACU    Handoff Report: Identifed the Patient, Identified the Reponsible Provider, Reviewed the pertinent medical history, Discussed the surgical course, Reviewed Intra-OP anesthesia mangement and issues during anesthesia, Set expectations for post-procedure period and Allowed opportunity for questions and acknowledgement of understanding      Vitals:  Vitals Value Taken Time   /51 09/30/22 0947   Temp     Pulse 87 09/30/22 0948   Resp 0 09/30/22 0948   SpO2 99 % 09/30/22 0948   Vitals shown include unvalidated device data.    Electronically Signed By: VIKI Black CRNA  September 30, 2022  9:50 AM

## 2022-09-30 NOTE — ANESTHESIA PROCEDURE NOTES
Airway       Patient location during procedure: OR       Procedure Start/Stop Times: 9/30/2022 7:43 AM  Staff -        CRNA: Anitra Sehldon APRN CRNA       Performed By: CRNA  Consent for Airway        Urgency: elective  Indications and Patient Condition       Indications for airway management: phi-procedural       Induction type:intravenous       Mask difficulty assessment: 1 - vent by mask    Final Airway Details       Final airway type: endotracheal airway       Successful airway: ETT - single  Endotracheal Airway Details        ETT size (mm): 6.5       Cuffed: yes       Successful intubation technique: direct laryngoscopy       DL Blade Type: Mckeon 3       Grade View of Cords: 1       Adjucts: stylet       Position: Right       Measured from: lips       Bite block used: Oral Airway    Post intubation assessment        Placement verified by: capnometry, equal breath sounds and chest rise        Number of attempts at approach: 1       Number of other approaches attempted: 0       Secured with: plastic tape       Ease of procedure: easy       Dentition: Intact and Unchanged    Medication(s) Administered   Medication Administration Time: 9/30/2022 7:43 AM

## 2022-09-30 NOTE — DISCHARGE INSTRUCTIONS
[facility] Same-Day Surgery   Adult Discharge Orders & Instructions after anesthesia    For 24 hours after surgery    Get plenty of rest.  A responsible adult must stay with you for at least 24 hours after you leave the hospital.   Do not drive or use heavy equipment.  If you have weakness or tingling, don't drive or use heavy equipment until this feeling goes away.  Do not drink alcohol.  Avoid strenuous or risky activities.  Ask for help when climbing stairs.   You may feel lightheaded.  If so, sit for a few minutes before standing.  Have someone help you get up.   You may have a slight fever. Call the doctor if your fever is over 100 F (37.7 C) (taken under the tongue) or lasts longer than 24 hours.  You may have a dry mouth, a sore throat, muscle aches or trouble sleeping.  These should go away after 24 hours.  Do not make important or legal decisions.  We don t expect you to have any problems from the surgery or treatment you had today. Just in case, here s what to do if you have pain, upset stomach (nausea), bleeding or infection:  Pain:  Take medicines your doctor has prescribed or over-the-counter medicine they have suggested. Resting and using ice packs can help, too. For surgery on an arm or leg, raise it on a pillow to ease swelling. Call your doctor if these methods don t work.  Copyright [employer], Licensed under [employer] International  Upset stomach (nausea):  Take anti-nausea medicine approved by your doctor. Drink clear liquids like apple juice, ginger ale, broth or 7-Up. Be sure to drink enough fluids. Rest can help, too. Move to normal foods when you re ready.   Bleeding:  In the first 24 hours, you may see a little blood on your dressing, about the size of a quarter. You don t need to worry about this much blood, but if the blood spot keeps getting bigger:  Put pressure on the wound if you can, AND  Call your doctor.  Copyright [employer], Licensed under CC4.0  International  Fever/Infection: Please call your doctor if you have any of these signs:  Redness  Swelling  Wound feels warm  Pain gets worse  Bad-smelling fluid leaks from wound  Fever or chills  Call your doctor for any of the followin.  It has been over 8 to 10 hours since surgery and you are still not able to urinate (pass water).    2.  Headache for over 24 hours.    Nurse advice line: [phone]

## 2022-10-06 ENCOUNTER — ALLIED HEALTH/NURSE VISIT (OUTPATIENT)
Dept: FAMILY MEDICINE | Facility: CLINIC | Age: 22
End: 2022-10-06
Payer: COMMERCIAL

## 2022-10-06 VITALS
DIASTOLIC BLOOD PRESSURE: 72 MMHG | HEART RATE: 84 BPM | TEMPERATURE: 98.4 F | OXYGEN SATURATION: 97 % | RESPIRATION RATE: 18 BRPM | SYSTOLIC BLOOD PRESSURE: 115 MMHG

## 2022-10-06 DIAGNOSIS — Z48.01 ENCOUNTER FOR CHANGE OR REMOVAL OF SURGICAL WOUND DRESSING: Primary | ICD-10-CM

## 2022-10-06 PROCEDURE — 99207 PR NO CHARGE NURSE ONLY: CPT

## 2022-10-06 ASSESSMENT — PAIN SCALES - GENERAL: PAINLEVEL: MILD PAIN (2)

## 2022-10-06 NOTE — PROGRESS NOTES
Juana Bacon is here today for a post-operative dressing change per the order of Dr. Jose Garza.  The patient had an excision of trigonum left ankle on 9/30/22.  The original post-operative dressing is clean, dry, and intact.  The dressing was removed and the foot/leg was cleansed with soap and water.     The patient reports that they are not using pain medication. They not need a refill today.  Patient reporting their pain is on average a 2/10.      /72   Pulse 84   Temp 98.4  F (36.9  C)   Resp 18   SpO2 97%     The incision was cleansed with Microklenz.  The incision appears to be clean, dry, and intact. There is minmial swelling and bruising.  There is no drainage.  Dr. Garza came in to the exam room to advise on follow-up care as patient was planning to follow-up with a provider in [place] as this is were she goes to school. After speaking with Dr. Garza, patient is planning to return for follow-up and has been scheduled with Dr. Garza 10/13/22.      The foot/leg was dressed with an adaptic over the incision, shingled guaze, kerlix, webril, post-op posterior splint, and ace wraps.      The patient was educated about the sign and symptoms that would warrant calling or seeking emergent medical care.  The patient was also re-educated about the importance of resting, ice, elevation and the compression dressing. The patient expressed understanding.     The patient has an appointment set up with Dr. Garza next week.      Reanna Spencer RN   [facility]

## 2022-10-13 ENCOUNTER — OFFICE VISIT (OUTPATIENT)
Dept: PODIATRY | Facility: CLINIC | Age: 22
End: 2022-10-13
Payer: COMMERCIAL

## 2022-10-13 VITALS
DIASTOLIC BLOOD PRESSURE: 70 MMHG | BODY MASS INDEX: 23.74 KG/M2 | SYSTOLIC BLOOD PRESSURE: 118 MMHG | HEIGHT: 65 IN | TEMPERATURE: 98.1 F | WEIGHT: 142.5 LBS

## 2022-10-13 DIAGNOSIS — Q68.8 OS TRIGONUM SYNDROME: Primary | ICD-10-CM

## 2022-10-13 PROCEDURE — 99024 POSTOP FOLLOW-UP VISIT: CPT | Performed by: PODIATRIST

## 2022-10-13 ASSESSMENT — PAIN SCALES - GENERAL: PAINLEVEL: MODERATE PAIN (4)

## 2022-10-13 NOTE — PROGRESS NOTES
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  Patient states that she is managing her discomfort as needed with OTC medication.    EXAM:  No apparent distress, patient is relaxed and comfortable.   Vitals: /70 (BP Location: Left arm, Patient Position: Sitting, Cuff Size: Adult Regular)   Temp 98.1  F (36.7  C) (Temporal)   Ht 1.64 m (5' 4.57\")   Wt 64.6 kg (142 lb 8 oz)   BMI 24.03 kg/m    Vasc:  DP and PT pulses palpable bilateral, CFT immediate to all digits and surrounding the surgical site.  Neuro:  Light touch sensation intact about the digits. There is minimal to no appreciable numbness around the surgical incision with examination.  Derm: The incision is well approximated and dry. Sutures are intact. Mild soft edema as expected. There is no surrounding erythema, heat, drainage or other signs of infection or hematoma.  Musc: Adequate reduction of deformity identified. No complications.    Assessment:      ICD-10-CM    1. 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   All sutures were removed.  No complications noted.  Minimal soft edema noted.  A bulky sterile dressing was applied with compression.   May begin bathing but avoid soaking or submersion x1 week  Dispensed a fracture boot today  Order to begin physical therapy and she would like to do this at [facility]  Physical therapy can progress as tolerated.  She can begin weightbearing to tolerance in the fracture boot now and I would expect her to remain in " the fracture boot until 4-6 weeks postop.   Would recommend compression such as Ace wrap in the fracture boot.  When she progresses out of the fracture boot she may progress to a compression sock or ankle sports brace to provide compression around the ankle.  May follow-up with me again in 4-5 weeks with any continued symptoms otherwise as needed.      Jose Garza DPM

## 2022-10-13 NOTE — LETTER
"    10/13/2022         RE: Juana Bacon  [address] [facility] [address]        Dear Colleague,    Thank you for referring your patient, Juana Bacon, to the [facility]. Please see a copy of my visit note below.    Chief Complaint   Patient presents with     Surgical Followup     (13d) NWB w/splint and cruthes, 1 fall, no fever; DOS 9/30/2022 - Excision os trigonum left ankle     Other     Presents with her mother 10/13/2022     Student at [employer], participates in [email or web address]. 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  Patient states that she is managing her discomfort as needed with OTC medication.    EXAM:  No apparent distress, patient is relaxed and comfortable.   Vitals: /70 (BP Location: Left arm, Patient Position: Sitting, Cuff Size: Adult Regular)   Temp 98.1  F (36.7  C) (Temporal)   Ht 1.64 m (5' 4.57\")   Wt 64.6 kg (142 lb 8 oz)   BMI 24.03 kg/m    Vasc:  DP and PT pulses palpable bilateral, CFT immediate to all digits and surrounding the surgical site.  Neuro:  Light touch sensation intact about the digits. There is minimal to no appreciable numbness around the surgical incision with examination.  Derm: The incision is well approximated and dry. Sutures are intact. Mild soft edema as expected. There is no surrounding erythema, heat, drainage or other signs of infection or hematoma.  Musc: Adequate reduction of deformity identified. No complications.    Assessment:      ICD-10-CM    1. 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   All sutures were removed.  No complications noted.  Minimal soft edema noted.  A bulky sterile dressing was applied with compression.   May begin bathing but avoid soaking or submersion x1 week  Dispensed a fracture " boot today  Order to begin physical therapy and she would like to do this at [facility]  Physical therapy can progress as tolerated.  She can begin weightbearing to tolerance in the fracture boot now and I would expect her to remain in the fracture boot until 4-6 weeks postop.   Would recommend compression such as Ace wrap in the fracture boot.  When she progresses out of the fracture boot she may progress to a compression sock or ankle sports brace to provide compression around the ankle.  May follow-up with me again in 4-5 weeks with any continued symptoms otherwise as needed.      Jose Garza DPM        Again, thank you for allowing me to participate in the care of your patient.        Sincerely,        Jose Garza DPM

## 2022-10-13 NOTE — NURSING NOTE
Successfully faxed PT order to [facility] @ [phone].     Dispensed 1 Pneumatic Walking Brace, Size Small, with [facility] agreement signed by patient.     Gabby Hooks CMA, October 13, 2022

## 2022-10-20 ENCOUNTER — MYC MEDICAL ADVICE (OUTPATIENT)
Dept: PODIATRY | Facility: CLINIC | Age: 22
End: 2022-10-20

## 2022-10-20 NOTE — TELEPHONE ENCOUNTER
See [email or web address] message. Provider please review and advise.    Loyda Bhat RN on 10/20/2022 at 4:50 PM

## 2022-10-21 NOTE — TELEPHONE ENCOUNTER
I am happy to create a virtual appt for her, a telephone encounter.     And may also attempt compression of her foot ankle and calf during the night to see if this helps cramping.

## 2022-10-24 NOTE — TELEPHONE ENCOUNTER
Attempted to call patient to assist in scheduling, no answer. Left voicemail and requested patient call back at [phone]. [employer] message was sent as well.     Reanna Spencer RN   [facility]

## 2022-10-25 ENCOUNTER — VIRTUAL VISIT (OUTPATIENT)
Dept: PODIATRY | Facility: CLINIC | Age: 22
End: 2022-10-25
Payer: COMMERCIAL

## 2022-10-25 DIAGNOSIS — Q68.8 OS TRIGONUM SYNDROME: Primary | ICD-10-CM

## 2022-10-25 PROCEDURE — 99024 POSTOP FOLLOW-UP VISIT: CPT | Performed by: PODIATRIST

## 2022-10-25 NOTE — LETTER
10/25/2022         RE: Juana Bacon  [address] [facility] [address]        Dear Colleague,    Thank you for referring your patient, Juana Bacon, to the [facility]. Please see a copy of my visit note below.    Juana is a 21 year old who is being evaluated via telephone for post op care.      What phone number would you like to be contacted at? 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  Patient notes that she is returning to activities but stays in the fracture boot mostly.  She still has some discomfort that will wake her up at night but since she started wearing a compression stocking this has improved considerably and is much better than last week.  She has physical therapy scheduled.     Objective         Vitals:  No vitals were obtained today due to virtual visit.    Physical Exam   healthy, alert and no distress  PSYCH: Alert and oriented times 3; coherent speech, normal   rate and volume, able to articulate logical thoughts, able   to abstract reason, no tangential thoughts, no hallucinations   or delusions  Her affect is normal and pleasant  RESP: No cough, no audible wheezing, able to talk in full sentences  Remainder of exam unable to be completed due to telephone visits    Vascular patient notes her foot is warm and CFT is immediate to all toes on her surgical foot.  Neuro: Patient describe slightly diminished sensation about her lateral fourth and fifth toes on the surgical foot left foot but no loss of sensation.  Derm: Patient notes that the incision is completely closed without complications  Musculoskeletal: Patient notes that she still developed some edema towards the end of the day circumferentially around her ankle mostly about the posterior ankle but no  erythema edema or heat throughout the calf itself.  She gets some tension or tightness when plantar flexing her ankle.  She does not feel comfortable performing toe raises at this time but is ambulating in the fracture boot without discomfort.    Assessment:      ICD-10-CM    1. Os trigonum syndrome  Q68.8           Plan:    10/13/2022  The dressing was removed and surgical site inspected.   All sutures were removed.  No complications noted.  Minimal soft edema noted.  A bulky sterile dressing was applied with compression.   May begin bathing but avoid soaking or submersion x1 week  Dispensed a fracture boot today  Order to begin physical therapy and she would like to do this at [facility]  Physical therapy can progress as tolerated.  She can begin weightbearing to tolerance in the fracture boot now and I would expect her to remain in the fracture boot until 4-6 weeks postop.   Would recommend compression such as Ace wrap in the fracture boot.  When she progresses out of the fracture boot she may progress to a compression sock or ankle sports brace to provide compression around the ankle.  May follow-up with me again in 4-5 weeks with any continued symptoms otherwise as needed.    10/25/2022  Recommend she progress out of the fracture boot for rest and light duty activities.  Progress physical therapy as tolerated  I would expect her to be progressing out of the fracture boot at about 4 weeks postop and certainly by 6 weeks  Would encourage ankle brace with good compression limiting ankle movement as she progresses out of the fracture boot and then back to physical sports sometime after 6 weeks post surgery.  Follow-up again in 3-4 weeks.      Jose Garza DPM          Again, thank you for allowing me to participate in the care of your patient.        Sincerely,        Jose Garza DPM

## 2022-10-25 NOTE — PATIENT INSTRUCTIONS
Sturdy and reliable ankle braces are most readily available on line, Logopro, or Enlighted and delivered for around $15-40.  Health insurance often does not pay for this.    Procare double strap ankle support   Tri Lock ankle brace   Figure of 8 ankle brace  Sweedo ankle brace

## 2022-10-25 NOTE — PROGRESS NOTES
Juana is a 21 year old who is being evaluated via telephone for post op care.      What phone number would you like to be contacted at? 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  Patient notes that she is returning to activities but stays in the fracture boot mostly.  She still has some discomfort that will wake her up at night but since she started wearing a compression stocking this has improved considerably and is much better than last week.  She has physical therapy scheduled.      Objective         Vitals:  No vitals were obtained today due to virtual visit.    Physical Exam   healthy, alert and no distress  PSYCH: Alert and oriented times 3; coherent speech, normal   rate and volume, able to articulate logical thoughts, able   to abstract reason, no tangential thoughts, no hallucinations   or delusions  Her affect is normal and pleasant  RESP: No cough, no audible wheezing, able to talk in full sentences  Remainder of exam unable to be completed due to telephone visits    Vascular patient notes her foot is warm and CFT is immediate to all toes on her surgical foot.  Neuro: Patient describe slightly diminished sensation about her lateral fourth and fifth toes on the surgical foot left foot but no loss of sensation.  Derm: Patient notes that the incision is completely closed without complications  Musculoskeletal: Patient notes that she still developed some edema towards the end of the day circumferentially around her ankle mostly about the posterior ankle but no erythema edema or heat throughout the calf itself.  She gets some tension or tightness when plantar flexing her ankle.  She does not feel comfortable performing toe raises at this time but is ambulating in the fracture boot without  discomfort.    Assessment:      ICD-10-CM    1. Os trigonum syndrome  Q68.8           Plan:    10/13/2022  The dressing was removed and surgical site inspected.   All sutures were removed.  No complications noted.  Minimal soft edema noted.  A bulky sterile dressing was applied with compression.   May begin bathing but avoid soaking or submersion x1 week  Dispensed a fracture boot today  Order to begin physical therapy and she would like to do this at [facility]  Physical therapy can progress as tolerated.  She can begin weightbearing to tolerance in the fracture boot now and I would expect her to remain in the fracture boot until 4-6 weeks postop.   Would recommend compression such as Ace wrap in the fracture boot.  When she progresses out of the fracture boot she may progress to a compression sock or ankle sports brace to provide compression around the ankle.  May follow-up with me again in 4-5 weeks with any continued symptoms otherwise as needed.    10/25/2022  Recommend she progress out of the fracture boot for rest and light duty activities.  Progress physical therapy as tolerated  I would expect her to be progressing out of the fracture boot at about 4 weeks postop and certainly by 6 weeks  Would encourage ankle brace with good compression limiting ankle movement as she progresses out of the fracture boot and then back to physical sports sometime after 6 weeks post surgery.  Follow-up again in 3-4 weeks.      Jose Garza DPM

## 2022-11-21 ENCOUNTER — HEALTH MAINTENANCE LETTER (OUTPATIENT)
Age: 22
End: 2022-11-21

## 2022-12-08 ENCOUNTER — MYC MEDICAL ADVICE (OUTPATIENT)
Dept: FAMILY MEDICINE | Facility: CLINIC | Age: 22
End: 2022-12-08

## 2022-12-08 NOTE — TELEPHONE ENCOUNTER
SITUATION:   Patient sent message.     Keyshawn Leon, I hope you re doing well. I was wondering if I can make an appointment with you sometime when I come back home or if you think that I need to see a gynecologist. I ve been bleeding for the past 2 weeks so it s not heavy but I wonder if it s breakthrough bleeding since now I m taking my birth control consistently and not having my period. I wanted to reach out and seek your opinion on it whether that s something that s just normal or If I need to be seen. Thank you! Have a good day     BACKGROUND:   Patient has been taking norgestimate-ethinyl estradiol (ORTHO-CYCLEN) 0.25-35 MG-MCG tablet continuously.       RN sent a message.     Good morning,     I am sorry to hear about your symptoms. Can you tell me how much you are bleeding (how often are you changing pads/tampons?) What color is the blood (bright or dark)? Any blood clots? When did you start taking your medication continuously?     Sultana Olmos, GAGEN, RN, PHN  [facility]  December 8, 2022

## 2023-05-18 ASSESSMENT — ASTHMA QUESTIONNAIRES
QUESTION_3 LAST FOUR WEEKS HOW OFTEN DID YOUR ASTHMA SYMPTOMS (WHEEZING, COUGHING, SHORTNESS OF BREATH, CHEST TIGHTNESS OR PAIN) WAKE YOU UP AT NIGHT OR EARLIER THAN USUAL IN THE MORNING: NOT AT ALL
QUESTION_1 LAST FOUR WEEKS HOW MUCH OF THE TIME DID YOUR ASTHMA KEEP YOU FROM GETTING AS MUCH DONE AT WORK, SCHOOL OR AT HOME: NONE OF THE TIME
QUESTION_5 LAST FOUR WEEKS HOW WOULD YOU RATE YOUR ASTHMA CONTROL: COMPLETELY CONTROLLED
QUESTION_2 LAST FOUR WEEKS HOW OFTEN HAVE YOU HAD SHORTNESS OF BREATH: NOT AT ALL
ACT_TOTALSCORE: 25
QUESTION_4 LAST FOUR WEEKS HOW OFTEN HAVE YOU USED YOUR RESCUE INHALER OR NEBULIZER MEDICATION (SUCH AS ALBUTEROL): NOT AT ALL
ACT_TOTALSCORE: 25

## 2023-05-18 ASSESSMENT — ENCOUNTER SYMPTOMS
WEAKNESS: 0
EYE PAIN: 0
CHILLS: 0
HEMATURIA: 0
FREQUENCY: 0
MYALGIAS: 0
NERVOUS/ANXIOUS: 0
ARTHRALGIAS: 0
CONSTIPATION: 0
HEMATOCHEZIA: 0
COUGH: 0
BREAST MASS: 0
PALPITATIONS: 0
SHORTNESS OF BREATH: 0
FEVER: 0
DIZZINESS: 0
SORE THROAT: 0
ABDOMINAL PAIN: 0
DYSURIA: 0
NAUSEA: 0
DIARRHEA: 0
JOINT SWELLING: 0
HEARTBURN: 0
HEADACHES: 0
PARESTHESIAS: 0

## 2023-05-25 ENCOUNTER — OFFICE VISIT (OUTPATIENT)
Dept: FAMILY MEDICINE | Facility: OTHER | Age: 23
End: 2023-05-25
Payer: COMMERCIAL

## 2023-05-25 VITALS
RESPIRATION RATE: 16 BRPM | HEIGHT: 65 IN | BODY MASS INDEX: 23.99 KG/M2 | DIASTOLIC BLOOD PRESSURE: 68 MMHG | OXYGEN SATURATION: 99 % | WEIGHT: 144 LBS | TEMPERATURE: 97.8 F | HEART RATE: 88 BPM | SYSTOLIC BLOOD PRESSURE: 108 MMHG

## 2023-05-25 DIAGNOSIS — J45.990 EXERCISE-INDUCED ASTHMA: Primary | ICD-10-CM

## 2023-05-25 DIAGNOSIS — N92.0 EXCESSIVE OR FREQUENT MENSTRUATION: ICD-10-CM

## 2023-05-25 PROCEDURE — 99395 PREV VISIT EST AGE 18-39: CPT | Performed by: PHYSICIAN ASSISTANT

## 2023-05-25 ASSESSMENT — ENCOUNTER SYMPTOMS
SHORTNESS OF BREATH: 0
DIARRHEA: 0
FREQUENCY: 0
DYSURIA: 0
PALPITATIONS: 0
PARESTHESIAS: 0
HEARTBURN: 0
HEADACHES: 0
BREAST MASS: 0
NERVOUS/ANXIOUS: 0
MYALGIAS: 0
COUGH: 0
HEMATOCHEZIA: 0
EYE PAIN: 0
ARTHRALGIAS: 0
JOINT SWELLING: 0
FEVER: 0
ABDOMINAL PAIN: 0
HEMATURIA: 0
WEAKNESS: 0
DIZZINESS: 0
CHILLS: 0
NAUSEA: 0
SORE THROAT: 0
CONSTIPATION: 0

## 2023-05-25 ASSESSMENT — PAIN SCALES - GENERAL: PAINLEVEL: NO PAIN (0)

## 2023-05-25 NOTE — PROGRESS NOTES
SUBJECTIVE:   CC: Juana is an 22 year old who presents for preventive health visit.       5/25/2023    10:17 AM   Additional Questions   Roomed by Loree       Patient has been advised of split billing requirements and indicates understanding: Yes     Healthy Habits:     Getting at least 3 servings of Calcium per day:  Yes    Bi-annual eye exam:  Yes    Dental care twice a year:  Yes    Sleep apnea or symptoms of sleep apnea:  None    Diet:  Regular (no restrictions)    Frequency of exercise:  4-5 days/week    Duration of exercise:  45-60 minutes    Taking medications regularly:  Yes    Medication side effects:  None    PHQ-2 Total Score: 0    Additional concerns today:  No                Today's PHQ-2 Score:       5/25/2023    10:10 AM   PHQ-2 ( 1999 Pf[employer])   Q1: Little interest or pleasure in doing things 0   Q2: Feeling down, depressed or hopeless 0   PHQ-2 Score 0   Q1: Little interest or pleasure in doing things Not at all   Q2: Feeling down, depressed or hopeless Not at all   PHQ-2 Score 0       Have you ever done Advance Care Planning? (For example, a Health Directive, POLST, or a discussion with a medical provider or your loved ones about your wishes): No, advance care planning information given to patient to review.  Patient plans to discuss their wishes with loved ones or provider.      Social History     Tobacco Use     Smoking status: Never     Smokeless tobacco: Never     Tobacco comments:     No smokers in the home   Vaping Use     Vaping status: Never Used   Substance Use Topics     Alcohol use: No           5/18/2023     2:18 PM   Alcohol Use   Prescreen: >3 drinks/day or >7 drinks/week? Not Applicable     Reviewed orders with patient.  Reviewed health maintenance and updated orders accordingly - Yes  Lab work is in process  Labs reviewed in EPIC  BP Readings from Last 3 Encounters:   05/25/23 108/68   10/13/22 118/70   10/06/22 115/72    Wt Readings from Last 3 Encounters:   05/25/23 65.3 kg  (144 lb)   10/13/22 64.6 kg (142 lb 8 oz) (72 %, Z= 0.58)*   22 64.6 kg (142 lb 8 oz) (72 %, Z= 0.59)*     * Growth percentiles are based on [facility] (Girls, 2-20 Years) data.                  Patient Active Problem List   Diagnosis     Exercise-induced asthma     Excessive or frequent menstruation     Past Surgical History:   Procedure Laterality Date     ARTHROTOMY ANKLE Left 2022    Procedure: Excision os trigonum left ankle;  Surgeon: Jose Garza DPM;  Location: [facility]     EXCISE MASS FOOT  2013    Procedure: EXCISE MASS FOOT;  Excise soft tissue mass left foot;  Surgeon: Mick Walden DPM;  Location: [facility] OR     NO HISTORY OF SURGERY         Social History     Tobacco Use     Smoking status: Never     Smokeless tobacco: Never     Tobacco comments:     No smokers in the home   Vaping Use     Vaping status: Never Used   Substance Use Topics     Alcohol use: No     Family History   Problem Relation Age of Onset     Congenital Anomalies Sister          at 3 days old from complications of intrauterine chicken pox     Diabetes Paternal Grandfather      Cancer Paternal Grandfather         kidney     Hypertension Paternal Grandfather      Cancer Paternal Grandmother         voice box         Current Outpatient Medications   Medication Sig Dispense Refill     Acetaminophen (TYLENOL PO)        AJOVY SOSY subcutaneous        albuterol (PROAIR HFA/PROVENTIL HFA/VENTOLIN HFA) 108 (90 Base) MCG/ACT inhaler Inhale 2 puffs into the lungs every 6 hours 18 g 4     IBUPROFEN PO        naproxen (NAPROSYN) 500 MG tablet TK 1 T PO BID PRF HA       norgestimate-ethinyl estradiol (ORTHO-CYCLEN) 0.25-35 MG-MCG tablet Take 1 tablet by mouth daily 112 tablet 3     Allergies   Allergen Reactions     Amoxicillin Hives     Recent Labs   Lab Test 10/04/18  1605 17  1612   ALT  --  15   CR  --  0.70   GFRESTIMATED  --  GFR not calculated, patient <16 years old.  Non  GFR Calc     GFRESTBLACK  --   GFR not calculated, patient <16 years old.   GFR Calc     POTASSIUM  --  4.2   TSH 1.56  --         Breast Cancer Screening:        History of abnormal Pap smear: NO - under age 21, PAP not appropriate for age     Reviewed and updated as needed this visit by clinical staff   Tobacco  Allergies  Meds              Reviewed and updated as needed this visit by Provider                 Past Medical History:   Diagnosis Date     Exercise-induced asthma      Hydronephrosis, right 2017     PONV (postoperative nausea and vomiting)      Unspecified fetal and  jaundice     treated with phototherapy in the  period     Volume depletion 2003    hospitalized at 7 mo for dehydration with Rotavirus      Past Surgical History:   Procedure Laterality Date     ARTHROTOMY ANKLE Left 2022    Procedure: Excision os trigonum left ankle;  Surgeon: Jose Garza DPM;  Location: [facility] OR     EXCISE MASS FOOT  2013    Procedure: EXCISE MASS FOOT;  Excise soft tissue mass left foot;  Surgeon: Mick Walden DPM;  Location: [facility]     NO HISTORY OF SURGERY         Review of Systems   Constitutional: Negative for chills and fever.   HENT: Negative for congestion, ear pain, hearing loss and sore throat.    Eyes: Negative for pain and visual disturbance.   Respiratory: Negative for cough and shortness of breath.    Cardiovascular: Negative for chest pain, palpitations and peripheral edema.   Gastrointestinal: Negative for abdominal pain, constipation, diarrhea, heartburn, hematochezia and nausea.   Breasts:  Negative for tenderness, breast mass and discharge.   Genitourinary: Negative for dysuria, frequency, genital sores, hematuria, pelvic pain, urgency, vaginal bleeding and vaginal discharge.   Musculoskeletal: Negative for arthralgias, joint swelling and myalgias.   Skin: Negative for rash.   Neurological: Negative for dizziness, weakness, headaches and paresthesias.  "  Psychiatric/Behavioral: Negative for mood changes. The patient is not nervous/anxious.        OBJECTIVE:   /68   Pulse 88   Temp 97.8  F (36.6  C) (Temporal)   Resp 16   Ht 1.644 m (5' 4.72\")   Wt 65.3 kg (144 lb)   LMP 05/24/2023   SpO2 99%   BMI 24.17 kg/m    Physical Exam  GENERAL: healthy, alert and no distress  EYES: Eyes grossly normal to inspection, PERRL and conjunctivae and sclerae normal  HENT: ear canals and TM's normal, nose and mouth without ulcers or lesions  NECK: no adenopathy, no asymmetry, masses, or scars and thyroid normal to palpation  RESP: lungs clear to auscultation - no rales, rhonchi or wheezes  CV: regular rate and rhythm, normal S1 S2, no S3 or S4, no murmur, click or rub, no peripheral edema and peripheral pulses strong  ABDOMEN: soft, nontender, no hepatosplenomegaly, no masses and bowel sounds normal  MS: no gross musculoskeletal defects noted, no edema  SKIN: no suspicious lesions or rashes  NEURO: Normal strength and tone, mentation intact and speech normal  PSYCH: mentation appears normal, affect normal/bright    Diagnostic Test Results:  Labs reviewed in Epic    ASSESSMENT/PLAN:   Juana was seen today for physical.    Diagnoses and all orders for this visit:    Exercise-induced asthma    Excessive or frequent menstruation        Patient has been advised of split billing requirements and indicates understanding: Yes      COUNSELING:  Reviewed preventive health counseling, as reflected in patient instructions       Regular exercise       Healthy diet/nutrition       Vision screening       Hearing screening        She reports that she has never smoked. She has never used smokeless tobacco.      Ted Henning PA-C  [facility]  "

## 2023-05-25 NOTE — LETTER
My Asthma Action Plan    Name: Juana Bacon   YOB: 2000  Date: 5/25/2023   My doctor: Ted Henning PA-C   My clinic: [facility]        My Rescue Medicine:   Albuterol inhaler (Proair/Ventolin/Proventil HFA)  2-4 puffs EVERY 4 HOURS as needed. Use a spacer if recommended by your provider.   My Asthma Severity:   Intermittent / Exercise Induced  Know your asthma triggers: exercise or sports and cold air             GREEN ZONE   Good Control  I feel good  No cough or wheeze  Can work, sleep and play without asthma symptoms       Take your asthma control medicine every day.     If exercise triggers your asthma, take your rescue medication  15 minutes before exercise or sports, and  During exercise if you have asthma symptoms  Spacer to use with inhaler: If you have a spacer, make sure to use it with your inhaler             YELLOW ZONE Getting Worse  I have ANY of these:  I do not feel good  Cough or wheeze  Chest feels tight  Wake up at night   Keep taking your Green Zone medications  Start taking your rescue medicine:  every 20 minutes for up to 1 hour. Then every 4 hours for 24-48 hours.  If you stay in the Yellow Zone for more than 12-24 hours, contact your doctor.  If you do not return to the Green Zone in 12-24 hours or you get worse, start taking your oral steroid medicine if prescribed by your provider.           RED ZONE Medical Alert - Get Help  I have ANY of these:  I feel awful  Medicine is not helping  Breathing getting harder  Trouble walking or talking  Nose opens wide to breathe       Take your rescue medicine NOW  If your provider has prescribed an oral steroid medicine, start taking it NOW  Call your doctor NOW  If you are still in the Red Zone after 20 minutes and you have not reached your doctor:  Take your rescue medicine again and  Call 911 or go to the emergency room right away    See your regular doctor within 2 weeks of an Emergency Room or Urgent  Care visit for follow-up treatment.          Annual Reminders:  Meet with Asthma Educator,  Flu Shot in the Fall, consider Pneumonia Vaccination for patients with asthma (aged 19 and older).    Pharmacy:    [facility] - [place] - [address] [facility] [address] - [place], [place] - [address] [facility]    Electronically signed by Ted Henning PA-C   Date: 05/25/23                    Asthma Triggers  How To Control Things That Make Your Asthma Worse    Triggers are things that make your asthma worse.  Look at the list below to help you find your triggers and   what you can do about them. You can help prevent asthma flare-ups by staying away from your triggers.      Trigger                                                          What you can do   Cigarette Smoke  Tobacco smoke can make asthma worse. Do not allow smoking in your home, car or around you.  Be sure no one smokes at a child s day care or school.  If you smoke, ask your health care provider for ways to help you quit.  Ask family members to quit too.  Ask your health care provider for a referral to Quit Plan to help you quit smoking, or call 8-155-898-PLAN.     Colds, Flu, Bronchitis  These are common triggers of asthma. Wash your hands often.  Don t touch your eyes, nose or mouth.  Get a flu shot every year.     Dust Mites  These are tiny bugs that live in cloth or carpet. They are too small to see. Wash sheets and blankets in hot water every week.   Encase pillows and mattress in dust mite proof covers.  Avoid having carpet if you can. If you have carpet, vacuum weekly.   Use a dust mask and HEPA vacuum.   Pollen and Outdoor Mold  Some people are allergic to trees, grass, or weed pollen, or molds. Try to keep your windows closed.  Limit time out doors when pollen count is high.   Ask you health care provider about taking medicine during allergy season.     Animal Dander  Some people are allergic to  skin flakes, urine or saliva from pets with fur or feathers. Keep pets with fur or feathers out of your home.    If you can t keep the pet outdoors, then keep the pet out of your bedroom.  Keep the bedroom door closed.  Keep pets off cloth furniture and away from stuffed toys.     Mice, Rats, and Cockroaches  Some people are allergic to the waste from these pests.   Cover food and garbage.  Clean up spills and food crumbs.  Store grease in the refrigerator.   Keep food out of the bedroom.   Indoor Mold  This can be a trigger if your home has high moisture. Fix leaking faucets, pipes, or other sources of water.   Clean moldy surfaces.  Dehumidify basement if it is damp and smelly.   Smoke, Strong Odors, and Sprays  These can reduce air quality. Stay away from strong odors and sprays, such as perfume, powder, hair spray, paints, smoke incense, paint, cleaning products, candles and new carpet.   Exercise or Sports  Some people with asthma have this trigger. Be active!  Ask your doctor about taking medicine before sports or exercise to prevent symptoms.    Warm up for 5-10 minutes before and after sports or exercise.     Other Triggers of Asthma  Cold air:  Cover your nose and mouth with a scarf.  Sometimes laughing or crying can be a trigger.  Some medicines and food can trigger asthma.

## 2023-06-14 ENCOUNTER — DOCUMENTATION ONLY (OUTPATIENT)
Dept: FAMILY MEDICINE | Facility: OTHER | Age: 23
End: 2023-06-14
Payer: COMMERCIAL

## 2023-06-14 DIAGNOSIS — J45.990 EXERCISE-INDUCED ASTHMA: ICD-10-CM

## 2023-06-14 DIAGNOSIS — N92.0 EXCESSIVE OR FREQUENT MENSTRUATION: Primary | ICD-10-CM

## 2023-06-14 NOTE — PROGRESS NOTES
This patient has an appointment for lab work but does not have any orders. Please place some future orders as needed.    Thank You,  Krista Burroughs MLT (ASCP)

## 2023-06-16 ENCOUNTER — LAB (OUTPATIENT)
Dept: LAB | Facility: OTHER | Age: 23
End: 2023-06-16
Payer: COMMERCIAL

## 2023-06-16 DIAGNOSIS — N92.0 EXCESSIVE OR FREQUENT MENSTRUATION: ICD-10-CM

## 2023-06-16 DIAGNOSIS — J45.990 EXERCISE-INDUCED ASTHMA: ICD-10-CM

## 2023-06-16 LAB
ALBUMIN SERPL BCG-MCNC: 3.8 G/DL (ref 3.5–5.2)
ALP SERPL-CCNC: 58 U/L (ref 35–104)
ALT SERPL W P-5'-P-CCNC: 13 U/L (ref 0–50)
ANION GAP SERPL CALCULATED.3IONS-SCNC: 11 MMOL/L (ref 7–15)
AST SERPL W P-5'-P-CCNC: 18 U/L (ref 0–45)
BILIRUB SERPL-MCNC: <0.2 MG/DL
BUN SERPL-MCNC: 11 MG/DL (ref 6–20)
CALCIUM SERPL-MCNC: 8.9 MG/DL (ref 8.6–10)
CHLORIDE SERPL-SCNC: 99 MMOL/L (ref 98–107)
CHOLEST SERPL-MCNC: 152 MG/DL
CREAT SERPL-MCNC: 0.82 MG/DL (ref 0.51–0.95)
DEPRECATED HCO3 PLAS-SCNC: 26 MMOL/L (ref 22–29)
GFR SERPL CREATININE-BSD FRML MDRD: >90 ML/MIN/1.73M2
GLUCOSE SERPL-MCNC: 82 MG/DL (ref 70–99)
HDLC SERPL-MCNC: 56 MG/DL
HGB BLD-MCNC: 12.6 G/DL (ref 11.7–15.7)
LDLC SERPL CALC-MCNC: 80 MG/DL
NONHDLC SERPL-MCNC: 96 MG/DL
POTASSIUM SERPL-SCNC: 4.3 MMOL/L (ref 3.4–5.3)
PROT SERPL-MCNC: 7.1 G/DL (ref 6.4–8.3)
SODIUM SERPL-SCNC: 136 MMOL/L (ref 136–145)
TRIGL SERPL-MCNC: 79 MG/DL
TSH SERPL DL<=0.005 MIU/L-ACNC: 2.31 UIU/ML (ref 0.3–4.2)

## 2023-06-16 PROCEDURE — 36415 COLL VENOUS BLD VENIPUNCTURE: CPT

## 2023-06-16 PROCEDURE — 84443 ASSAY THYROID STIM HORMONE: CPT

## 2023-06-16 PROCEDURE — 80053 COMPREHEN METABOLIC PANEL: CPT

## 2023-06-16 PROCEDURE — 85018 HEMOGLOBIN: CPT

## 2023-06-16 PROCEDURE — 80061 LIPID PANEL: CPT

## 2023-07-06 ENCOUNTER — OFFICE VISIT (OUTPATIENT)
Dept: PODIATRY | Facility: CLINIC | Age: 23
End: 2023-07-06
Payer: COMMERCIAL

## 2023-07-06 ENCOUNTER — ANCILLARY PROCEDURE (OUTPATIENT)
Dept: GENERAL RADIOLOGY | Facility: CLINIC | Age: 23
End: 2023-07-06
Attending: PODIATRIST
Payer: COMMERCIAL

## 2023-07-06 VITALS
BODY MASS INDEX: 23.82 KG/M2 | HEIGHT: 65 IN | WEIGHT: 143 LBS | SYSTOLIC BLOOD PRESSURE: 108 MMHG | DIASTOLIC BLOOD PRESSURE: 66 MMHG

## 2023-07-06 DIAGNOSIS — Q66.6 PES VALGUS: Primary | ICD-10-CM

## 2023-07-06 DIAGNOSIS — Q68.8 OS TRIGONUM SYNDROME: ICD-10-CM

## 2023-07-06 DIAGNOSIS — M65.979 SYNOVITIS OF ANKLE: ICD-10-CM

## 2023-07-06 DIAGNOSIS — Q66.229 METATARSUS ADDUCTUS, CONGENITAL: ICD-10-CM

## 2023-07-06 PROCEDURE — 73630 X-RAY EXAM OF FOOT: CPT | Mod: TC | Performed by: RADIOLOGY

## 2023-07-06 PROCEDURE — 99213 OFFICE O/P EST LOW 20 MIN: CPT | Performed by: PODIATRIST

## 2023-07-06 ASSESSMENT — PAIN SCALES - GENERAL: PAINLEVEL: SEVERE PAIN (6)

## 2023-07-06 NOTE — LETTER
2023         RE: Juana Bacon  [address] [facility] [address]        Dear Colleague,    Thank you for referring your patient, Juana Bacon, to the [facility]. Please see a copy of my visit note below.    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  Patient feels like this procedure helped considerably reduce her pain but she still gets pain and guarding through the left rear foot and ankle    Still pain around the distal ankle and subtalar joint and edema, guarding and limping off and on.  Pain is limiting running activity. Removing the accessory ossicle really helped but still weak.   Custom Orthotics help a lot and prefers to wear them all time.  Cannot walk much without them.     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 Is using OTC ankle brace but any running hurts.     ROS:  10 point ROS neg other than the symptoms noted above in the HPI.    Patient Active Problem List   Diagnosis     Exercise-induced asthma     Excessive or frequent menstruation       PAST MEDICAL HISTORY:   Past Medical History:   Diagnosis Date     Exercise-induced asthma      Hydronephrosis, right 2017     PONV (postoperative nausea and vomiting)      Unspecified fetal and  jaundice     treated with phototherapy in the  period     Volume depletion 2003    hospitalized at 7 mo for dehydration with Rotavirus        PAST SURGICAL HISTORY:   Past Surgical History:   Procedure Laterality Date     ARTHROTOMY ANKLE Left 2022    Procedure: Excision os trigonum left  ankle;  Surgeon: Jose Garza DPM;  Location: [facility]     EXCISE MASS FOOT  9/30/2013    Procedure: EXCISE MASS FOOT;  Excise soft tissue mass left foot;  Surgeon: Mick Walden DPM;  Location: [facility] OR     NO HISTORY OF SURGERY          MEDICATIONS:   Current Outpatient Medications:      Acetaminophen (TYLENOL PO), , Disp: , Rfl:      AJOVY SOSY subcutaneous, , Disp: , Rfl:      IBUPROFEN PO, , Disp: , Rfl:      naproxen (NAPROSYN) 500 MG tablet, TK 1 T PO BID PRF HA, Disp: , Rfl:      norgestimate-ethinyl estradiol (ORTHO-CYCLEN) 0.25-35 MG-MCG tablet, Take 1 tablet by mouth daily, Disp: 112 tablet, Rfl: 3     albuterol (PROAIR HFA/PROVENTIL HFA/VENTOLIN HFA) 108 (90 Base) MCG/ACT inhaler, Inhale 2 puffs into the lungs every 6 hours (Patient not taking: Reported on 7/6/2023), Disp: 18 g, Rfl: 4     ALLERGIES:    Allergies   Allergen Reactions     Amoxicillin Hives        SOCIAL HISTORY:   Social History     Socioeconomic History     Marital status: Single     Spouse name: Not on file     Number of children: Not on file     Years of education: Not on file     Highest education level: Not on file   Occupational History     Not on file   Tobacco Use     Smoking status: Never     Smokeless tobacco: Never     Tobacco comments:     No smokers in the home   Vaping Use     Vaping Use: Never used   Substance and Sexual Activity     Alcohol use: No     Drug use: No     Sexual activity: Not Currently     Partners: Male     Birth control/protection: Pill   Other Topics Concern     Parent/sibling w/ CABG, MI or angioplasty before 65F 55M? No   Social History Narrative     Not on file     Social Determinants of Health     Financial Resource Strain: Not on file   Food Insecurity: Not on file   Transportation Needs: Not on file   Physical Activity: Not on file   Stress: Not on file   Social Connections: Not on file   Intimate Partner Violence: Not on file   Housing Stability: Unknown (8/9/2022)    Housing Stability Vital  "Sign      Unable to Pay for Housing in the Last Year: No      Number of Places Lived in the Last Year: Not on file      Unstable Housing in the Last Year: No        FAMILY HISTORY:   Family History   Problem Relation Age of Onset     Congenital Anomalies Sister          at 3 days old from complications of intrauterine chicken pox     Diabetes Paternal Grandfather      Cancer Paternal Grandfather         kidney     Hypertension Paternal Grandfather      Cancer Paternal Grandmother         voice box        EXAM:Vitals: /66 (BP Location: Left arm, Patient Position: Sitting, Cuff Size: Adult Regular)   Ht 1.644 m (5' 4.72\")   Wt 64.9 kg (143 lb)   LMP 2023   BMI 24.00 kg/m    BMI= Body mass index is 24 kg/m .    General appearance: Patient is alert and fully cooperative with history & exam.  No sign of distress is noted during the visit.     Psychiatric: Affect is pleasant & appropriate.  Patient appears motivated to improve health.     Respiratory: Breathing is regular & unlabored while sitting.     HEENT: Hearing is intact to spoken word.  Speech is clear.  No gross evidence of visual impairment that would impact ambulation.     Vascular: DP & PT pulses are intact & regular bilaterally.  No significant edema or varicosities noted.  CFT and skin temperature is normal to both lower extremities.     Neurologic: Lower extremity sensation is intact to light touch.  No evidence of weakness or contracture in the lower extremities.  No evidence of neuropathy.    Dermatologic: Skin is intact to both lower extremities with adequate texture, turgor and tone about the integument.  No paronychia or evidence of soft tissue infection is noted.     Musculoskeletal: Patient is ambulatory without assistive device or brace.  Upon first evaluation her foot looks fairly rectus however upon loading her foot significant valgus is noted with the foot lateral to the leg and mild tibial varum.  There is no crepitus or " tracking through range of motion of the rear foot or ankle however there is clearly discomfort and reproduction of achiness noted with firm palpation about the posterior left ankle left subtalar joint in the area of his tarsi this is not noted on the right.  Range of motion through the ankle and subtalar joint may be possibly limited slightly but no crepitus or bony block is noted.     ASSESSMENT:       ICD-10-CM    1. Pes valgus  Q66.6 Physical Therapy Referral     XR Foot Left G/E 3 Views      2. Os trigonum syndrome  Q68.8 XR Foot Left G/E 3 Views      3. Synovitis of ankle  M65.9 XR Foot Left G/E 3 Views           PLAN:  Reviewed patient's chart in [facility].      7/6/2023   Offered to repeat advanced imaging or consider an injection.  Also discussed physical therapy  After discussion she wishes to attempt some physical therapy mostly to improve range of motion of her lateral ankle and sinus tarsi.  I suspect most of her symptoms are coming from her sinus tarsi through the posterior facet of the subtalar joint but no obvious tarsal coalition or complete coalition is noted and no instability of the subtalar joint is noted no crepitus through range of motion of the subtalar joint.  Also no ankle instability is noted about the ankle no obvious peroneal subluxation.    Injection of the sinus tarsi or subtalar joint would be next step as she progresses through physical therapy.  Follow-up after attempting some physical therapy.      Jose Garza DPM        Again, thank you for allowing me to participate in the care of your patient.        Sincerely,        Jose Garza DPM

## 2023-07-06 NOTE — PROGRESS NOTES
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  Patient feels like this procedure helped considerably reduce her pain but she still gets pain and guarding through the left rear foot and ankle    Still pain around the distal ankle and subtalar joint and edema, guarding and limping off and on.  Pain is limiting running activity. Removing the accessory ossicle really helped but still weak.   Custom Orthotics help a lot and prefers to wear them all time.  Cannot walk much without them.     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 Is using OTC ankle brace but any running hurts.     ROS:  10 point ROS neg other than the symptoms noted above in the HPI.    Patient Active Problem List   Diagnosis     Exercise-induced asthma     Excessive or frequent menstruation       PAST MEDICAL HISTORY:   Past Medical History:   Diagnosis Date     Exercise-induced asthma      Hydronephrosis, right 2017     PONV (postoperative nausea and vomiting)      Unspecified fetal and  jaundice     treated with phototherapy in the  period     Volume depletion 2003    hospitalized at 7 mo for dehydration with Rotavirus        PAST SURGICAL HISTORY:   Past Surgical History:   Procedure Laterality Date     ARTHROTOMY ANKLE Left 2022    Procedure: Excision os trigonum left ankle;  Surgeon: Jose Garza DPM;  [facility]: [facility]     EXCISE MASS FOOT  2013    Procedure: EXCISE MASS FOOT;  Excise soft tissue mass left foot;  Surgeon: Mick Walden DPM;  Location: [facility]     NO HISTORY OF SURGERY           MEDICATIONS:   Current Outpatient Medications:      Acetaminophen (TYLENOL PO), , Disp: , Rfl:      AJOVY SOSY subcutaneous, , Disp: , Rfl:      IBUPROFEN PO, , Disp: , Rfl:      naproxen (NAPROSYN) 500 MG tablet, TK 1 T PO BID PRF HA, Disp: , Rfl:      norgestimate-ethinyl estradiol (ORTHO-CYCLEN) 0.25-35 MG-MCG tablet, Take 1 tablet by mouth daily, Disp: 112 tablet, Rfl: 3     albuterol (PROAIR HFA/PROVENTIL HFA/VENTOLIN HFA) 108 (90 Base) MCG/ACT inhaler, Inhale 2 puffs into the lungs every 6 hours (Patient not taking: Reported on 7/6/2023), Disp: 18 g, Rfl: 4     ALLERGIES:    Allergies   Allergen Reactions     Amoxicillin Hives        SOCIAL HISTORY:   Social History     Socioeconomic History     Marital status: Single     Spouse name: Not on file     Number of children: Not on file     Years of education: Not on file     Highest education level: Not on file   Occupational History     Not on file   Tobacco Use     Smoking status: Never     Smokeless tobacco: Never     Tobacco comments:     No smokers in the home   Vaping Use     Vaping Use: Never used   Substance and Sexual Activity     Alcohol use: No     Drug use: No     Sexual activity: Not Currently     Partners: Male     Birth control/protection: Pill   Other Topics Concern     Parent/sibling w/ CABG, MI or angioplasty before 65F 55M? No   Social History Narrative     Not on file     Social Determinants of Health     Financial Resource Strain: Not on file   Food Insecurity: Not on file   Transportation Needs: Not on file   Physical Activity: Not on file   Stress: Not on file   Social Connections: Not on file   Intimate Partner Violence: Not on file   Housing Stability: Unknown (8/9/2022)    Housing Stability Vital Sign      Unable to Pay for Housing in the Last Year: No      Number of Places Lived in the Last Year: Not on file      Unstable Housing in the Last Year: No        FAMILY HISTORY:   Family History   Problem Relation Age of Onset     Congenital  "Anomalies Sister          at 3 days old from complications of intrauterine chicken pox     Diabetes Paternal Grandfather      Cancer Paternal Grandfather         kidney     Hypertension Paternal Grandfather      Cancer Paternal Grandmother         voice box        EXAM:Vitals: /66 (BP Location: Left arm, Patient Position: Sitting, Cuff Size: Adult Regular)   Ht 1.644 m (5' 4.72\")   Wt 64.9 kg (143 lb)   LMP 2023   BMI 24.00 kg/m    BMI= Body mass index is 24 kg/m .    General appearance: Patient is alert and fully cooperative with history & exam.  No sign of distress is noted during the visit.     Psychiatric: Affect is pleasant & appropriate.  Patient appears motivated to improve health.     Respiratory: Breathing is regular & unlabored while sitting.     HEENT: Hearing is intact to spoken word.  Speech is clear.  No gross evidence of visual impairment that would impact ambulation.     Vascular: DP & PT pulses are intact & regular bilaterally.  No significant edema or varicosities noted.  CFT and skin temperature is normal to both lower extremities.     Neurologic: Lower extremity sensation is intact to light touch.  No evidence of weakness or contracture in the lower extremities.  No evidence of neuropathy.    Dermatologic: Skin is intact to both lower extremities with adequate texture, turgor and tone about the integument.  No paronychia or evidence of soft tissue infection is noted.     Musculoskeletal: Patient is ambulatory without assistive device or brace.  Upon first evaluation her foot looks fairly rectus however upon loading her foot significant valgus is noted with the foot lateral to the leg and mild tibial varum.  There is no crepitus or tracking through range of motion of the rear foot or ankle however there is clearly discomfort and reproduction of achiness noted with firm palpation about the posterior left ankle left subtalar joint in the area of his tarsi this is not noted on the " right.  Range of motion through the ankle and subtalar joint may be possibly limited slightly but no crepitus or bony block is noted.    Radiographs 7/6/2023 4 views left foot demonstrate small accessory ossicle or fracture fragment on the dorsal navicular however this appears to be chronic and very stable.  Possibly flat morphology noted about the posterior facet but no obvious tarsal coalition is identified.  Mildly elevated metatarsus adductus angle or C-shaped foot structure noted.     ASSESSMENT:       ICD-10-CM    1. Pes valgus  Q66.6 Physical Therapy Referral     XR Foot Left G/E 3 Views      2. Os trigonum syndrome  Q68.8 XR Foot Left G/E 3 Views      3. Synovitis of ankle  M65.9 XR Foot Left G/E 3 Views      4. Metatarsus adductus, congenital  Q66.229            PLAN:  Reviewed patient's chart in [facility].      7/6/2023   Offered to repeat advanced imaging or consider an injection.  Also discussed physical therapy  After discussion she wishes to attempt some physical therapy mostly to improve range of motion of her lateral ankle and sinus tarsi.  I suspect most of her symptoms are coming from her sinus tarsi through the posterior facet of the subtalar joint but no obvious tarsal coalition or complete coalition is noted and no instability of the subtalar joint is noted no crepitus through range of motion of the subtalar joint.  Also no ankle instability is noted about the ankle no obvious peroneal subluxation.    Injection of the sinus tarsi or subtalar joint would be next step as she progresses through physical therapy.  Follow-up after attempting some physical therapy.      Jose Garza DPM

## 2023-07-19 ENCOUNTER — OFFICE VISIT (OUTPATIENT)
Dept: PODIATRY | Facility: OTHER | Age: 23
End: 2023-07-19
Payer: COMMERCIAL

## 2023-07-19 VITALS
WEIGHT: 143 LBS | BODY MASS INDEX: 23.82 KG/M2 | DIASTOLIC BLOOD PRESSURE: 62 MMHG | HEIGHT: 65 IN | SYSTOLIC BLOOD PRESSURE: 106 MMHG

## 2023-07-19 DIAGNOSIS — M65.979 SYNOVITIS OF ANKLE: Primary | ICD-10-CM

## 2023-07-19 DIAGNOSIS — Q66.229 METATARSUS ADDUCTUS, CONGENITAL: ICD-10-CM

## 2023-07-19 PROCEDURE — 20605 DRAIN/INJ JOINT/BURSA W/O US: CPT | Mod: LT | Performed by: PODIATRIST

## 2023-07-19 ASSESSMENT — PAIN SCALES - GENERAL: PAINLEVEL: SEVERE PAIN (6)

## 2023-07-19 NOTE — LETTER
2023         RE: Juana Bacon  [address] [facility] [address]        Dear Colleague,    Thank you for referring your patient, Juana Bacon, to the [facility]. Please see a copy of my visit note below.    Chief Complaint   Patient presents with     RECHECK     Pes valgus, Os trigonum syndrome, synovitis of ankle, metatarsus adductus - congenital; DOS 2022 - Excision os trigonum left ankle; LOV 2023     Orders     Request injection     Student at [employer], participates in [employer]. Works as a  aide and  youth [employer]    Subjective: Patient reports for followup of DOS 2022 - Excision os trigonum left ankle procedure.  Patient feels like this procedure helped considerably reduce her pain but she still gets pain and guarding through the left rear foot and ankle    Still pain around the distal ankle and subtalar joint and edema, guarding and limping off and on.  Pain is limiting running activity. Removing the accessory ossicle really helped but still weak.   Custom Orthotics help a lot and prefers to wear them all time.  Cannot walk much without them.     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 Is using OTC ankle brace but any running hurts.     ROS:  10 point ROS neg other than the symptoms noted above in the HPI.    Patient Active Problem List   Diagnosis     Exercise-induced asthma     Excessive or frequent menstruation       PAST MEDICAL HISTORY:   Past Medical History:   Diagnosis Date     Exercise-induced asthma      Hydronephrosis, right 2017     PONV (postoperative nausea and vomiting)      Unspecified fetal and  jaundice     treated with phototherapy in the  period     Volume depletion 2003    hospitalized at 7 mo for dehydration with Rotavirus        PAST SURGICAL HISTORY:   Past Surgical History:   Procedure Laterality Date      ARTHROTOMY ANKLE Left 9/30/2022    Procedure: Excision os trigonum left ankle;  Surgeon: Jose Garza DPM;  Location: [facility]     EXCISE MASS FOOT  9/30/2013    Procedure: EXCISE MASS FOOT;  Excise soft tissue mass left foot;  Surgeon: Mick Walden DPM;  Location: [facility]     NO HISTORY OF SURGERY          MEDICATIONS:   Current Outpatient Medications:      AJOVY SOSY subcutaneous, , Disp: , Rfl:      naproxen (NAPROSYN) 500 MG tablet, TK 1 T PO BID PRF HA, Disp: , Rfl:      norgestimate-ethinyl estradiol (ORTHO-CYCLEN) 0.25-35 MG-MCG tablet, Take 1 tablet by mouth daily, Disp: 112 tablet, Rfl: 3     Acetaminophen (TYLENOL PO), , Disp: , Rfl:      albuterol (PROAIR HFA/PROVENTIL HFA/VENTOLIN HFA) 108 (90 Base) MCG/ACT inhaler, Inhale 2 puffs into the lungs every 6 hours (Patient not taking: Reported on 7/6/2023), Disp: 18 g, Rfl: 4     IBUPROFEN PO, , Disp: , Rfl:      ALLERGIES:    Allergies   Allergen Reactions     Amoxicillin Hives        SOCIAL HISTORY:   Social History     Socioeconomic History     Marital status: Single     Spouse name: Not on file     Number of children: Not on file     Years of education: Not on file     Highest education level: Not on file   Occupational History     Not on file   Tobacco Use     Smoking status: Never     Smokeless tobacco: Never     Tobacco comments:     No smokers in the home   Vaping Use     Vaping Use: Never used   Substance and Sexual Activity     Alcohol use: No     Drug use: No     Sexual activity: Not Currently     Partners: Male     Birth control/protection: Pill   Other Topics Concern     Parent/sibling w/ CABG, MI or angioplasty before 65F 55M? No   Social History Narrative     Not on file     Social Determinants of Health     Financial Resource Strain: Not on file   Food Insecurity: Not on file   Transportation Needs: Not on file   Physical Activity: Not on file   Stress: Not on file   Social Connections: Not on file   Intimate Partner Violence: Not on  "file   Housing Stability: Unknown (2022)    Housing Stability Vital Sign      Unable to Pay for Housing in the Last Year: No      Number of Places Lived in the Last Year: Not on file      Unstable Housing in the Last Year: No        FAMILY HISTORY:   Family History   Problem Relation Age of Onset     Congenital Anomalies Sister          at 3 days old from complications of intrauterine chicken pox     Diabetes Paternal Grandfather      Cancer Paternal Grandfather         kidney     Hypertension Paternal Grandfather      Cancer Paternal Grandmother         voice box        EXAM:Vitals: /62 (BP Location: Left arm, Patient Position: Sitting, Cuff Size: Adult Regular)   Ht 1.644 m (5' 4.72\")   Wt 64.9 kg (143 lb)   LMP 2023   BMI 24.00 kg/m    BMI= Body mass index is 24 kg/m .    General appearance: Patient is alert and fully cooperative with history & exam.  No sign of distress is noted during the visit.     Psychiatric: Affect is pleasant & appropriate.  Patient appears motivated to improve health.     Respiratory: Breathing is regular & unlabored while sitting.     HEENT: Hearing is intact to spoken word.  Speech is clear.  No gross evidence of visual impairment that would impact ambulation.     Vascular: DP & PT pulses are intact & regular bilaterally.  No significant edema or varicosities noted.  CFT and skin temperature is normal to both lower extremities.     Neurologic: Lower extremity sensation is intact to light touch.  No evidence of weakness or contracture in the lower extremities.  No evidence of neuropathy.    Dermatologic: Skin is intact to both lower extremities with adequate texture, turgor and tone about the integument.  No paronychia or evidence of soft tissue infection is noted.     Musculoskeletal: Patient is ambulatory without assistive device or brace.  Upon first evaluation her foot looks fairly rectus however upon loading her foot significant valgus is noted with the foot " lateral to the leg and mild tibial varum.  There is no crepitus or tracking through range of motion of the rear foot or ankle however there is clearly discomfort and reproduction of achiness noted with firm palpation about the posterior left ankle left subtalar joint in the area of his tarsi this is not noted on the right.  Range of motion through the ankle and subtalar joint may be possibly limited slightly but no crepitus or bony block is noted.    Radiographs 7/6/2023 4 views left foot demonstrate small accessory ossicle or fracture fragment on the dorsal navicular however this appears to be chronic and very stable.  Possibly flat morphology noted about the posterior facet but no obvious tarsal coalition is identified.  Mildly elevated metatarsus adductus angle or C-shaped foot structure noted.     ASSESSMENT:     No diagnosis found.     PLAN:  Reviewed patient's chart in [facility].      7/6/2023   Offered to repeat advanced imaging or consider an injection.  Also discussed physical therapy  After discussion she wishes to attempt some physical therapy mostly to improve range of motion of her lateral ankle and sinus tarsi.  I suspect most of her symptoms are coming from her sinus tarsi through the posterior facet of the subtalar joint but no obvious tarsal coalition or complete coalition is noted and no instability of the subtalar joint is noted no crepitus through range of motion of the subtalar joint.  Also no ankle instability is noted about the ankle no obvious peroneal subluxation.    Injection of the sinus tarsi or subtalar joint would be next step as she progresses through physical therapy.  Follow-up after attempting some physical therapy.    7/19/2023  Patient is requesting injection of the left posterior facet of the left ankle joint.  Their are pain is limiting her ability to run and train for lacrosse.  I obtained informed consent to inject subtalar joint posterior facet of the left ankle.  I discussed with  the patient potential risks and complications as well as alternative treatment options and post procedure care requirements.  Follow-up in the next month  Or 2 if this remains symptomatic in any way otherwise as needed.        Jose Garza DPM      Again, thank you for allowing me to participate in the care of your patient.        Sincerely,        Jose Garza DPM

## 2023-07-19 NOTE — PROGRESS NOTES
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  Patient feels like this procedure helped considerably reduce her pain but she still gets pain and guarding through the left rear foot and ankle    Still pain around the distal ankle and subtalar joint and edema, guarding and limping off and on.  Pain is limiting running activity. Removing the accessory ossicle really helped but still weak.   Custom Orthotics help a lot and prefers to wear them all time.  Cannot walk much without them.     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 Is using OTC ankle brace but any running hurts.     ROS:  10 point ROS neg other than the symptoms noted above in the HPI.    Patient Active Problem List   Diagnosis    Exercise-induced asthma    Excessive or frequent menstruation       PAST MEDICAL HISTORY:   Past Medical History:   Diagnosis Date    Exercise-induced asthma     Hydronephrosis, right 2017    PONV (postoperative nausea and vomiting)     Unspecified fetal and  jaundice     treated with phototherapy in the  period    Volume depletion 2003    hospitalized at 7 mo for dehydration with Rotavirus        PAST SURGICAL HISTORY:   Past Surgical History:   Procedure Laterality Date    ARTHROTOMY ANKLE Left 2022    Procedure: Excision os trigonum left ankle;  Surgeon: Jose Garza DPM;  Location: [facility] OR    EXCISE MASS FOOT  2013    Procedure: EXCISE MASS FOOT;  Excise soft tissue mass left foot;  Surgeon: Mick Walden DPM;   Location: [facility] OR    NO HISTORY OF SURGERY          MEDICATIONS:   Current Outpatient Medications:     AJOVY SOSY subcutaneous, , Disp: , Rfl:     naproxen (NAPROSYN) 500 MG tablet, TK 1 T PO BID PRF HA, Disp: , Rfl:     norgestimate-ethinyl estradiol (ORTHO-CYCLEN) 0.25-35 MG-MCG tablet, Take 1 tablet by mouth daily, Disp: 112 tablet, Rfl: 3    Acetaminophen (TYLENOL PO), , Disp: , Rfl:     albuterol (PROAIR HFA/PROVENTIL HFA/VENTOLIN HFA) 108 (90 Base) MCG/ACT inhaler, Inhale 2 puffs into the lungs every 6 hours (Patient not taking: Reported on 7/6/2023), Disp: 18 g, Rfl: 4    IBUPROFEN PO, , Disp: , Rfl:      ALLERGIES:    Allergies   Allergen Reactions    Amoxicillin Hives        SOCIAL HISTORY:   Social History     Socioeconomic History    Marital status: Single     Spouse name: Not on file    Number of children: Not on file    Years of education: Not on file    Highest education level: Not on file   Occupational History    Not on file   Tobacco Use    Smoking status: Never    Smokeless tobacco: Never    Tobacco comments:     No smokers in the home   Vaping Use    Vaping Use: Never used   Substance and Sexual Activity    Alcohol use: No    Drug use: No    Sexual activity: Not Currently     Partners: Male     Birth control/protection: Pill   Other Topics Concern    Parent/sibling w/ CABG, MI or angioplasty before 65F 55M? No   Social History Narrative    Not on file     Social Determinants of Health     Financial Resource Strain: Not on file   Food Insecurity: Not on file   Transportation Needs: Not on file   Physical Activity: Not on file   Stress: Not on file   Social Connections: Not on file   Intimate Partner Violence: Not on file   Housing Stability: Unknown (8/9/2022)    Housing Stability Vital Sign     Unable to Pay for Housing in the Last Year: No     Number of Places Lived in the Last Year: Not on file     Unstable Housing in the Last Year: No        FAMILY HISTORY:   Family History   Problem Relation Age  "of Onset    Congenital Anomalies Sister          at 3 days old from complications of intrauterine chicken pox    Diabetes Paternal Grandfather     Cancer Paternal Grandfather         kidney    Hypertension Paternal Grandfather     Cancer Paternal Grandmother         voice box        EXAM:Vitals: /62 (BP Location: Left arm, Patient Position: Sitting, Cuff Size: Adult Regular)   Ht 1.644 m (5' 4.72\")   Wt 64.9 kg (143 lb)   LMP 2023   BMI 24.00 kg/m    BMI= Body mass index is 24 kg/m .    General appearance: Patient is alert and fully cooperative with history & exam.  No sign of distress is noted during the visit.     Psychiatric: Affect is pleasant & appropriate.  Patient appears motivated to improve health.     Respiratory: Breathing is regular & unlabored while sitting.     HEENT: Hearing is intact to spoken word.  Speech is clear.  No gross evidence of visual impairment that would impact ambulation.     Vascular: DP & PT pulses are intact & regular bilaterally.  No significant edema or varicosities noted.  CFT and skin temperature is normal to both lower extremities.     Neurologic: Lower extremity sensation is intact to light touch.  No evidence of weakness or contracture in the lower extremities.  No evidence of neuropathy.    Dermatologic: Skin is intact to both lower extremities with adequate texture, turgor and tone about the integument.  No paronychia or evidence of soft tissue infection is noted.     Musculoskeletal: Patient is ambulatory without assistive device or brace.  Upon first evaluation her foot looks fairly rectus however upon loading her foot significant valgus is noted with the foot lateral to the leg and mild tibial varum.  There is no crepitus or tracking through range of motion of the rear foot or ankle however there is clearly discomfort and reproduction of achiness noted with firm palpation about the posterior left ankle left subtalar joint in the area of his tarsi this " is not noted on the right.  Range of motion through the ankle and subtalar joint may be possibly limited slightly but no crepitus or bony block is noted.    Radiographs 7/6/2023 4 views left foot demonstrate small accessory ossicle or fracture fragment on the dorsal navicular however this appears to be chronic and very stable.  Possibly flat morphology noted about the posterior facet but no obvious tarsal coalition is identified.  Mildly elevated metatarsus adductus angle or C-shaped foot structure noted.     ASSESSMENT:     No diagnosis found.     PLAN:  Reviewed patient's chart in [facility].      7/6/2023   Offered to repeat advanced imaging or consider an injection.  Also discussed physical therapy  After discussion she wishes to attempt some physical therapy mostly to improve range of motion of her lateral ankle and sinus tarsi.  I suspect most of her symptoms are coming from her sinus tarsi through the posterior facet of the subtalar joint but no obvious tarsal coalition or complete coalition is noted and no instability of the subtalar joint is noted no crepitus through range of motion of the subtalar joint.  Also no ankle instability is noted about the ankle no obvious peroneal subluxation.    Injection of the sinus tarsi or subtalar joint would be next step as she progresses through physical therapy.  Follow-up after attempting some physical therapy.    7/19/2023  Patient is requesting injection of the left posterior facet of the left ankle joint.  Their are pain is limiting her ability to run and train for lacrosse.  I obtained informed consent to inject subtalar joint posterior facet of the left ankle.  I discussed with the patient potential risks and complications as well as alternative treatment options and post procedure care requirements.  Follow-up in the next month  Or 2 if this remains symptomatic in any way otherwise as needed.        Jose Garza DPM

## 2023-07-28 ENCOUNTER — THERAPY VISIT (OUTPATIENT)
Dept: PHYSICAL THERAPY | Facility: CLINIC | Age: 23
End: 2023-07-28
Attending: PODIATRIST
Payer: COMMERCIAL

## 2023-07-28 DIAGNOSIS — M25.572 PAIN IN JOINT INVOLVING ANKLE AND FOOT, LEFT: Primary | ICD-10-CM

## 2023-07-28 DIAGNOSIS — Q66.6 PES VALGUS: ICD-10-CM

## 2023-07-28 PROCEDURE — 97110 THERAPEUTIC EXERCISES: CPT | Mod: GP | Performed by: PHYSICAL THERAPIST

## 2023-07-28 PROCEDURE — 97140 MANUAL THERAPY 1/> REGIONS: CPT | Mod: GP | Performed by: PHYSICAL THERAPIST

## 2023-07-28 PROCEDURE — 97161 PT EVAL LOW COMPLEX 20 MIN: CPT | Mod: GP | Performed by: PHYSICAL THERAPIST

## 2023-07-28 NOTE — PROGRESS NOTES
"PHYSICAL THERAPY EVALUATION  Type of Visit: Evaluation    See electronic medical record for Abuse and Falls Screening details.    Subjective       Presenting condition or subjective complaint: Foot/ ankle synpovitis, tried multiple recovery options has not gotten better. Patient is a  and is very active. She developed shin splints with continued pain into the leg. She later had an MRI which showed a stress fracture in her ankle. She had a surgery on 9/30/22 by Dr. Garza for \"Excision os trigonum left ankle \".   Date of onset: 09/30/22    Relevant medical history:     Dates & types of surgery: September 2022 fractured heel    Prior diagnostic imaging/testing results: MRI; X-ray     Prior therapy history for the same diagnosis, illness or injury: Yes Physical therapy after surgery Oct 2022-March 2023    Prior Level of Function  Transfers:   Ambulation:   ADL:   IADL:     Living Environment  Social support: Alone   Type of home: Apartment/condo   Stairs to enter the home: Yes 10 Is there a railing: No   Ramp: No   Stairs inside the home: No       Help at home: None  Equipment owned: Crutches     Employment: No    Hobbies/Interests: Sports, work (on my feet the whole shift) fitness    Patient goals for therapy: Being able to do things  with confidence and little to no pain    Pain assessment: See objective evaluation for additional pain details     Objective   FOOT/ANKLE EVALUATION  PAIN: Pain Level at Rest: 0/10  Pain Level with Use: 8/10  Pain Location: deep pain in the ankle joint area  Pain Quality: \"deep ache\"  Pain Frequency: intermittent  Pain is Worst: always worse later in the day  Pain is Exacerbated By: running (up to 20 minutes), prolonged time on feet  Pain is Relieved By: rest, compression sleeve  Pain Progression: Unchanged  INTEGUMENTARY (edema, incisions):   POSTURE:   GAIT:   Weightbearing Status:   Assistive Device(s):   Gait Deviations:   BALANCE/PROPRIOCEPTION:  unsteady on left ankle " compared to right  WEIGHT BEARING ALIGNMENT:   NON-WEIGHTBEARING ALIGNMENT:  Pes valgus B    ROM:     STRENGTH:  PF 5-/5, DF 5/5, INV 4/5, EV 4/5  FLEXIBILITY:  gastroc and soleus tightness  SPECIAL TESTS:   FUNCTIONAL TESTS:   PALPATION:  no pain with palpation today  JOINT MOBILITY:  hypomobile TCJ and STJ overall    Assessment & Plan   CLINICAL IMPRESSIONS  Medical Diagnosis: Pes valgus    Treatment Diagnosis: Left ankle & foot pain   Impression/Assessment: Patient is a 22 year old female with left ankle complaints.  The following significant findings have been identified: Pain, Decreased ROM/flexibility, Decreased joint mobility, Decreased strength, Impaired muscle performance, Decreased activity tolerance, and Instability. These impairments interfere with their ability to perform recreational activities and community mobility as compared to previous level of function.     Clinical Decision Making (Complexity):  Clinical Presentation: Stable/Uncomplicated  Clinical Presentation Rationale: based on medical and personal factors listed in PT evaluation  Clinical Decision Making (Complexity): Low complexity    PLAN OF CARE  Treatment Interventions:  Interventions: Manual Therapy, Neuromuscular Re-education, Therapeutic Activity, Therapeutic Exercise, Self-Care/Home Management    Long Term Goals     PT Goal 1  Goal Identifier: LTG 1  Goal Description: Patient will be able to run for 2 miles without pain in foot during or after activity  Rationale:  (to allow for full participation in collegiate sports and recreational activities for healthy lifestyle)  Target Date: 09/22/23      Frequency of Treatment: 1x/week  Duration of Treatment: 6 visits    Recommended Referrals to Other Professionals:  none  Education Assessment:   Learner/Method: No Barriers to Learning;Pictures/Video;Demonstration;Reading;Listening;Patient    Risks and benefits of evaluation/treatment have been explained.   Patient/Family/caregiver agrees  with Plan of Care.     Evaluation Time:     PT Massimo Low Complexity Minutes (92456): 15     Signing Clinician: Stu Pa PT

## 2023-08-29 ENCOUNTER — THERAPY VISIT (OUTPATIENT)
Dept: PHYSICAL THERAPY | Facility: CLINIC | Age: 23
End: 2023-08-29
Payer: COMMERCIAL

## 2023-08-29 DIAGNOSIS — Q66.6 PES VALGUS: Primary | ICD-10-CM

## 2023-08-29 DIAGNOSIS — M25.572 PAIN IN JOINT INVOLVING ANKLE AND FOOT, LEFT: ICD-10-CM

## 2023-08-29 PROCEDURE — 97110 THERAPEUTIC EXERCISES: CPT | Mod: GP | Performed by: PHYSICAL THERAPIST

## 2023-08-29 PROCEDURE — 97140 MANUAL THERAPY 1/> REGIONS: CPT | Mod: GP | Performed by: PHYSICAL THERAPIST

## 2023-08-29 PROCEDURE — 97112 NEUROMUSCULAR REEDUCATION: CPT | Mod: GP | Performed by: PHYSICAL THERAPIST

## 2023-08-29 NOTE — PROGRESS NOTES
08/29/23 0500   Appointment Info   Signing clinician's name / credentials Jade Russo PT   Total/Authorized Visits [employer]   Visits Used 2   Medical Diagnosis Pes valgus   PT Tx Diagnosis Left ankle & foot pain   Progress Note/Certification   Onset of illness/injury or Date of Surgery 09/30/22   Therapy Frequency 1x/week   Predicted Duration 6 visits   PT Goal 1   Goal Identifier LTG 1   Goal Description Patient will be able to run for 2 miles without pain in foot during or after activity   Rationale   (to allow for full participation in collegiate sports and recreational activities for healthy lifestyle)   Goal Progress Run 3 mins/Walk 1 min x 1.5 miles   Target Date 09/22/23   Subjective Report   Subjective Report Will start [employer] practice Sept 7th, so just performing pre-conditioning and running. Run/Walk 3'/1' x 1.5 miles.   Objective Measures   Objective Measures Objective Measure 1   Objective Measure 1   Objective Measure AROM L Ankle DF = 0, PF = 40, IN = 30 and Eversion = 20 degrees. Decreased subtalar posterior glide. TTP over incision and mid-medial calf.   Details Able to perform double and single leg squats in good mechanics. Ambulates in normal gait pattern on level surfaces and stairs.   Treatment Interventions (PT)   Interventions Neuromuscular Re-education   Therapeutic Procedure/Exercise   Therapeutic Procedures: strength, endurance, ROM, flexibillity minutes (06385) 20   PTRx Ther Proc 1 Eccentric Toe Raise Gastroc   PTRx Ther Proc 1 - Details 10 advancing to 30 (up both feet and slowly lower left single)   PTRx Ther Proc 2 Theraband Ankle Dorsiflexion   PTRx Ther Proc 2 - Details 10 progressing to 30 with green band   PTRx Ther Proc 3 Theraband Ankle Inversion   PTRx Ther Proc 3 - Details 10-30 x or to fatigue green band   PTRx Ther Proc 4 Ankle Eversion Strengthening With Theraband   PTRx Ther Proc 4 - Details 10-30  or to fatigue with green band   Skilled Intervention Progressive  strengthening and ROM for pain reduction   Patient Response/Progress INV/EV fatiguing, no pain   PTRx Ther Proc 5 Standing Gastroc Stretch   PTRx Ther Proc 5 - Details 30 seconds x 3 each side   PTRx Ther Proc 6 Standing Gastroc Stretch   PTRx Ther Proc 6 - Details 20 seconds x 3   PTRx Ther Proc 7 Squat   PTRx Ther Proc 7 - Details 10-30  in good mechanics   Therapeutic Activity   PTRx Ther Act 1 Foam Roller Calf   PTRx Ther Act 1 - Details 10-15 with 5 sec holds and TFM incision scar tissue   Neuromuscular Re-education   Neuromuscular re-ed of mvmt, balance, coord, kinesthetic sense, posture, proprioception minutes (29643) 10   PTRx Neuro Re-ed 1 Balance Single Leg Stance Supported and Unsupported   PTRx Neuro Re-ed 1 - Details 30-60 seconds   Patient Response/Progress No pain, unsteady   PTRx Neuro Re-ed 2 Knee Single Leg Squat   PTRx Neuro Re-ed 2 - Details 10 progressing to 30 to fatigue each leg without pain in good mechanics   Manual Therapy   Manual Therapy: Mobilization, MFR, MLD, friction massage minutes (29464) 10   Manual Therapy Manual Therapy 2   Manual Therapy 1 Self Talocrural Joint Mobility with Band   Manual Therapy 1 - Details 10 seconds x5   Manual Therapy 2 Joint mobilizaitons   Manual Therapy 2 - Details TCJ distraction, subtalar mobs all directions, grade II-III --  x5 min total   Patient Response/Progress Minor decrease in stiffness afterwards   Skilled Intervention TFM incision   Intervention (Other)   PTRx Other  1 Self Talocrural Joint Mobility with Band   PTRx Other 1 - Details 10-15 with 5 sec holds   Education   Learner/Method No Barriers to Learning;Pictures/Video;Demonstration;Reading;Listening;Patient   Plan   Home program PTRX   Plan for next session DC if no further appts made before Nov. 1. Plans to work with coaches and  at [facility] to rehab toward return to sport.   Total Session Time   Timed Code Treatment Minutes 40   Total Treatment Time (sum of timed and untimed  services) 40         MIO Arias is returning to [facility] in [place] to work with coaches and  to rehab her foot/ankle for return to to running & sport  ([employer]). She will call to [facility] follo-ups if needed during the next month of Sept.     Beginning/End Dates of Progress Note Reporting Period:   7/23/23 to 08/29/2023    Referring Provider:  Jose Garza

## 2023-09-25 ENCOUNTER — OFFICE VISIT (OUTPATIENT)
Dept: URGENT CARE | Facility: URGENT CARE | Age: 23
End: 2023-09-25
Payer: COMMERCIAL

## 2023-09-25 VITALS
HEART RATE: 86 BPM | WEIGHT: 145 LBS | BODY MASS INDEX: 24.16 KG/M2 | TEMPERATURE: 97.8 F | RESPIRATION RATE: 16 BRPM | DIASTOLIC BLOOD PRESSURE: 70 MMHG | SYSTOLIC BLOOD PRESSURE: 108 MMHG | HEIGHT: 65 IN | OXYGEN SATURATION: 100 %

## 2023-09-25 DIAGNOSIS — T63.441A BEE STING REACTION, ACCIDENTAL OR UNINTENTIONAL, INITIAL ENCOUNTER: Primary | ICD-10-CM

## 2023-09-25 PROCEDURE — 99213 OFFICE O/P EST LOW 20 MIN: CPT | Performed by: PHYSICIAN ASSISTANT

## 2023-09-25 RX ORDER — PREDNISONE 20 MG/1
40 TABLET ORAL DAILY
Qty: 10 TABLET | Refills: 0 | Status: SHIPPED | OUTPATIENT
Start: 2023-09-25 | End: 2023-09-30

## 2023-09-25 NOTE — PROGRESS NOTES
URGENT CARE VISIT:    SUBJECTIVE:   HPI:   Juana Bacon is a 22 year old who presents with swelling located over left  hand since 1 day(s) ago after getting stung by a bee. Rash is gradual onset and rash seems to be worsening. She describes rash as itching. Patient denies difficulty breathing or throat/tongue swelling. Patient has tried Benadryl with no relief of symptoms. Patient has not had contact exposures to new laundry detergents, soaps, lotions, or other potential irritants. Denies new foods or medications.  Patient denies previous history of a similar rash. No one around them has had a similar rash.     PMH:   Past Medical History:   Diagnosis Date    Exercise-induced asthma     Hydronephrosis, right 2017    PONV (postoperative nausea and vomiting)     Unspecified fetal and  jaundice     treated with phototherapy in the  period    Volume depletion 2003    hospitalized at 7 mo for dehydration with Rotavirus     Allergies: Amoxicillin   Medications:   Current Outpatient Medications   Medication Sig Dispense Refill    FIDEL VELAZQUEZ subcutaneous       norgestimate-ethinyl estradiol (ORTHO-CYCLEN) 0.25-35 MG-MCG tablet Take 1 tablet by mouth daily 112 tablet 3    predniSONE (DELTASONE) 20 MG tablet Take 2 tablets (40 mg) by mouth daily for 5 days 10 tablet 0    Acetaminophen (TYLENOL PO)       albuterol (PROAIR HFA/PROVENTIL HFA/VENTOLIN HFA) 108 (90 Base) MCG/ACT inhaler Inhale 2 puffs into the lungs every 6 hours (Patient not taking: Reported on 2023) 18 g 4    IBUPROFEN PO       naproxen (NAPROSYN) 500 MG tablet TK 1 T PO BID PRF HA       Social History:   Social History     Socioeconomic History    Marital status: Single     Spouse name: Not on file    Number of children: Not on file    Years of education: Not on file    Highest education level: Not on file   Occupational History    Not on file   Tobacco Use    Smoking status: Never    Smokeless tobacco: Never    Tobacco comments:  "    No smokers in the home   Vaping Use    Vaping Use: Never used   Substance and Sexual Activity    Alcohol use: No    Drug use: No    Sexual activity: Not Currently     Partners: Male     Birth control/protection: Pill   Other Topics Concern    Parent/sibling w/ CABG, MI or angioplasty before 65F 55M? No   Social History Narrative    Not on file     Social Determinants of Health     Financial Resource Strain: Not on file   Food Insecurity: Not on file   Transportation Needs: Not on file   Physical Activity: Not on file   Stress: Not on file   Social Connections: Not on file   Interpersonal Safety: Not on file   Housing Stability: Unknown (8/9/2022)    Housing Stability Vital Sign     Unable to Pay for Housing in the Last Year: No     Number of Places Lived in the Last Year: Not on file     Unstable Housing in the Last Year: No       ROS: ROS otherwise found to be negative except as noted above.    OBJECTIVE:  /70   Pulse 86   Temp 97.8  F (36.6  C) (Temporal)   Resp 16   Ht 1.651 m (5' 5\")   Wt 65.8 kg (145 lb)   SpO2 100%   BMI 24.13 kg/m    General: WDWN in NAD.   Eyes: Non-injected conjunctivas without drainage bilaterally.  Ears: Bilateral TMs are easily visualized without erythema, injection, or effusion. No erythema or edema of external canals.    Oropharynx: No erythema of oropharynx. No edema of tongue.   Cardiac: RRR without murmurs, rubs, or gallops.  Respiratory: LCTAB without adventitious sounds. Non-labored breathing.  Integumentary:   Distribution: localized  Location: left dorsal hand    Color: red,  Lesion type: macular, confluent with warmth and mild erythema  Neuro: Alert and oriented.       ASSESSMENT:     ICD-10-CM    1. Bee sting reaction, accidental or unintentional, initial encounter  T63.441A predniSONE (DELTASONE) 20 MG tablet           PLAN:  Patient Instructions   Patient was educated on the natural course of condition. Conservative measures discussed including " over-the-counter antihistamines (Zyrtec or Allegra).  Take medication as directed. Side effects discussed. See your primary care provider if symptoms do not improve in 3 days. Seek emergency care if you develop severe swelling, difficulty swallowing, or difficulty breathing.  Patient verbalized understanding and is agreeable to plan. The patient was discharged ambulatory and in stable condition.    Madelyn Fox PA-C on 9/25/2023 at 11:07 AM

## 2023-09-26 ENCOUNTER — MYC MEDICAL ADVICE (OUTPATIENT)
Dept: FAMILY MEDICINE | Facility: OTHER | Age: 23
End: 2023-09-26
Payer: COMMERCIAL

## 2023-09-26 DIAGNOSIS — T63.481A ALLERGIC REACTION TO INSECT STING, ACCIDENTAL OR UNINTENTIONAL, INITIAL ENCOUNTER: Primary | ICD-10-CM

## 2023-10-04 ENCOUNTER — LAB (OUTPATIENT)
Dept: LAB | Facility: CLINIC | Age: 23
End: 2023-10-04
Payer: COMMERCIAL

## 2023-10-04 DIAGNOSIS — T63.481A ALLERGIC REACTION TO INSECT STING, ACCIDENTAL OR UNINTENTIONAL, INITIAL ENCOUNTER: ICD-10-CM

## 2023-10-04 PROCEDURE — 36415 COLL VENOUS BLD VENIPUNCTURE: CPT

## 2023-10-04 PROCEDURE — 86003 ALLG SPEC IGE CRUDE XTRC EA: CPT | Mod: 59

## 2023-10-04 PROCEDURE — 86003 ALLG SPEC IGE CRUDE XTRC EA: CPT

## 2023-10-05 ENCOUNTER — MYC MEDICAL ADVICE (OUTPATIENT)
Dept: FAMILY MEDICINE | Facility: OTHER | Age: 23
End: 2023-10-05
Payer: COMMERCIAL

## 2023-10-05 DIAGNOSIS — Z91.038 HISTORY OF INSECT STING ALLERGY: Primary | ICD-10-CM

## 2023-10-05 LAB
HONEY BEE IGE QN: <0.1 KU(A)/L
YELLOW HORNET IGE QN: 0.59 KU(A)/L
YELLOW JACKET IGE QN: 4.05 KU(A)/L

## 2023-10-06 RX ORDER — EPINEPHRINE 0.3 MG/.3ML
0.3 INJECTION SUBCUTANEOUS PRN
Qty: 2 EACH | Refills: 0 | Status: SHIPPED | OUTPATIENT
Start: 2023-10-06 | End: 2024-09-13

## 2023-10-12 NOTE — PROGRESS NOTES
08/29/23 0500   Appointment Info   Signing clinician's name / credentials Jade Russo PT   Total/Authorized Visits E&T   Visits Used 2   Medical Diagnosis Pes valgus   PT Tx Diagnosis Left ankle & foot pain   Progress Note/Certification   Onset of illness/injury or Date of Surgery 09/30/22   Therapy Frequency 1x/week   Predicted Duration 6 visits   Progress Note Completed Date 10/12/23   PT Goal 1   Goal Identifier LTG 1   Goal Description Patient will be able to run for 2 miles without pain in foot during or after activity   Rationale   (to allow for full participation in collegiate sports and recreational activities for healthy lifestyle)   Goal Progress Run 3 mins/Walk 1 min x 1.5 miles   Target Date 09/22/23   Subjective Report   Subjective Report Will start [employer] practice Sept 7th, so just performing pre-conditioning and running. Run/Walk 3'/1' x 1.5 miles.   Objective Measures   Objective Measures Objective Measure 1   Objective Measure 1   Objective Measure AROM L Ankle DF = 0, PF = 40, IN = 30 and Eversion = 20 degrees. Decreased subtalar posterior glide. TTP over incision and mid-medial calf.   Details Able to perform double and single leg squats in good mechanics. Ambulates in normal gait pattern on level surfaces and stairs.   Treatment Interventions (PT)   Interventions Neuromuscular Re-education   Therapeutic Procedure/Exercise   Therapeutic Procedures: strength, endurance, ROM, flexibillity minutes (75523) 20   PTRx Ther Proc 1 Eccentric Toe Raise Gastroc   PTRx Ther Proc 1 - Details 10 advancing to 30 (up both feet and slowly lower left single)   PTRx Ther Proc 2 Theraband Ankle Dorsiflexion   PTRx Ther Proc 2 - Details 10 progressing to 30 with green band   PTRx Ther Proc 3 Theraband Ankle Inversion   PTRx Ther Proc 3 - Details 10-30 x or to fatigue green band   PTRx Ther Proc 4 Ankle Eversion Strengthening With Theraband   PTRx Ther Proc 4 - Details 10-30  or to fatigue with green band    Skilled Intervention Progressive strengthening and ROM for pain reduction   Patient Response/Progress INV/EV fatiguing, no pain   PTRx Ther Proc 5 Standing Gastroc Stretch   PTRx Ther Proc 5 - Details 30 seconds x 3 each side   PTRx Ther Proc 6 Standing Gastroc Stretch   PTRx Ther Proc 6 - Details 20 seconds x 3   PTRx Ther Proc 7 Squat   PTRx Ther Proc 7 - Details 10-30  in good mechanics   Therapeutic Activity   PTRx Ther Act 1 Foam Roller Calf   PTRx Ther Act 1 - Details 10-15 with 5 sec holds and TFM incision scar tissue   Neuromuscular Re-education   Neuromuscular re-ed of mvmt, balance, coord, kinesthetic sense, posture, proprioception minutes (74858) 10   PTRx Neuro Re-ed 1 Balance Single Leg Stance Supported and Unsupported   PTRx Neuro Re-ed 1 - Details 30-60 seconds   Patient Response/Progress No pain, unsteady   PTRx Neuro Re-ed 2 Knee Single Leg Squat   PTRx Neuro Re-ed 2 - Details 10 progressing to 30 to fatigue each leg without pain in good mechanics   Manual Therapy   Manual Therapy: Mobilization, MFR, MLD, friction massage minutes (66051) 10   Manual Therapy Manual Therapy 2   Manual Therapy 1 Self Talocrural Joint Mobility with Band   Manual Therapy 1 - Details 10 seconds x5   Manual Therapy 2 Joint mobilizaitons   Manual Therapy 2 - Details TCJ distraction, subtalar mobs all directions, grade II-III --  x5 min total   Patient Response/Progress Minor decrease in stiffness afterwards   Skilled Intervention TFM incision   Intervention (Other)   PTRx Other  1 Self Talocrural Joint Mobility with Band   PTRx Other 1 - Details 10-15 with 5 sec holds   Education   Learner/Method No Barriers to Learning;Pictures/Video;Demonstration;Reading;Listening;Patient   Plan   Home program PTRX   Plan for next session DC if no further appts made before Nov. 1. Plans to work with coaches and  at [facility] to rehab toward return to sport.   Total Session Time   Timed Code Treatment Minutes 40   Total Treatment  Time (sum of timed and untimed services) 40         DISCHARGE  Reason for Discharge: Patient has failed to schedule further appointments.    Equipment Issued: none    Discharge Plan: Patient to continue home program.    Referring Provider:  Jose Garza

## 2023-10-26 DIAGNOSIS — N94.6 DYSMENORRHEA: ICD-10-CM

## 2023-10-26 RX ORDER — NORGESTIMATE AND ETHINYL ESTRADIOL 0.25-0.035
1 KIT ORAL DAILY
Qty: 112 TABLET | Refills: 3 | Status: SHIPPED | OUTPATIENT
Start: 2023-10-26 | End: 2023-10-30

## 2023-10-26 NOTE — TELEPHONE ENCOUNTER
Physical within the last year. Will refill until seen by new pcp. Please have patient schedule urine testing (required annually) next week.         Carolyn Lopez, Pediatric Nurse Practitioner   [facility]

## 2023-10-27 ENCOUNTER — TELEPHONE (OUTPATIENT)
Dept: FAMILY MEDICINE | Facility: CLINIC | Age: 23
End: 2023-10-27
Payer: COMMERCIAL

## 2023-10-30 ENCOUNTER — TELEPHONE (OUTPATIENT)
Dept: FAMILY MEDICINE | Facility: CLINIC | Age: 23
End: 2023-10-30
Payer: COMMERCIAL

## 2023-10-30 DIAGNOSIS — N94.6 DYSMENORRHEA: ICD-10-CM

## 2023-10-30 RX ORDER — NORGESTIMATE AND ETHINYL ESTRADIOL 0.25-0.035
1 KIT ORAL DAILY
Qty: 84 TABLET | Refills: 3 | Status: SHIPPED | OUTPATIENT
Start: 2023-10-30

## 2023-10-30 NOTE — TELEPHONE ENCOUNTER
Received call from Lauren at [facility].     Caller is wondering if birth control sent on 10/26/23 was supposed to state to take continuously due to 112 tablets vs. 84 tablets being prescribed.     If patient is to take medication continuously caller can take a verbal over the phone if pharmacy is called and verbal is given to a pharmacist.   Provider can also send script as well noting to take continuously.     If taking continuously not intended please resend script with updated quantity.     GAGE UriarteN, RN  [facility] ~ Registered Nurse  Clinic Triage ~ [facility]  October 30, 2023

## 2023-10-30 NOTE — TELEPHONE ENCOUNTER
Juana does not take continuous, script adjusted.    Carolyn Lopez, Pediatric Nurse Practitioner   [facility]

## 2023-10-30 NOTE — TELEPHONE ENCOUNTER
Juana does not take continuously.    Carolyn Lopez, Pediatric Nurse Practitioner   [facility]

## 2023-11-01 ENCOUNTER — LAB (OUTPATIENT)
Dept: LAB | Facility: CLINIC | Age: 23
End: 2023-11-01
Payer: COMMERCIAL

## 2023-11-01 DIAGNOSIS — N94.6 DYSMENORRHEA: ICD-10-CM

## 2023-11-01 PROCEDURE — 87491 CHLMYD TRACH DNA AMP PROBE: CPT

## 2023-11-01 PROCEDURE — 87591 N.GONORRHOEAE DNA AMP PROB: CPT

## 2023-11-02 ENCOUNTER — TELEPHONE (OUTPATIENT)
Dept: FAMILY MEDICINE | Facility: CLINIC | Age: 23
End: 2023-11-02
Payer: COMMERCIAL

## 2023-11-02 ENCOUNTER — MYC MEDICAL ADVICE (OUTPATIENT)
Dept: FAMILY MEDICINE | Facility: CLINIC | Age: 23
End: 2023-11-02
Payer: COMMERCIAL

## 2023-11-02 DIAGNOSIS — A74.9 CHLAMYDIA INFECTION: Primary | ICD-10-CM

## 2023-11-02 DIAGNOSIS — Z32.00 ENCOUNTER FOR PREGNANCY TEST, RESULT UNKNOWN: Primary | ICD-10-CM

## 2023-11-02 LAB
C TRACH DNA SPEC QL NAA+PROBE: POSITIVE
N GONORRHOEA DNA SPEC QL NAA+PROBE: NEGATIVE

## 2023-11-02 RX ORDER — DOXYCYCLINE 100 MG/1
100 CAPSULE ORAL 2 TIMES DAILY
Qty: 14 CAPSULE | Refills: 0 | Status: SHIPPED | OUTPATIENT
Start: 2023-11-02 | End: 2023-11-09

## 2023-11-02 NOTE — TELEPHONE ENCOUNTER
Spoke to patient about positive chlamydia results. Will treat with doxycyline. Partner will schedule appointment for treatment. Recommend refrain from sex for 7 days.    Carolyn Lopez, Pediatric Nurse Practitioner   [facility]

## 2023-11-02 NOTE — TELEPHONE ENCOUNTER
Lab result sent: Keyshawn Arias, I tried calling to discuss with you, please call me before 4 today at the clinic [phone], click on the option that says talk to a team member and ask to be routed back to me.     Carolyn Lopez, Pediatric Nurse Practitioner   [facility]

## 2024-04-25 ENCOUNTER — PATIENT OUTREACH (OUTPATIENT)
Dept: CARE COORDINATION | Facility: CLINIC | Age: 24
End: 2024-04-25
Payer: COMMERCIAL

## 2024-05-09 ENCOUNTER — PATIENT OUTREACH (OUTPATIENT)
Dept: CARE COORDINATION | Facility: CLINIC | Age: 24
End: 2024-05-09
Payer: COMMERCIAL

## 2024-06-07 SDOH — HEALTH STABILITY: PHYSICAL HEALTH: ON AVERAGE, HOW MANY MINUTES DO YOU ENGAGE IN EXERCISE AT THIS LEVEL?: 40 MIN

## 2024-06-07 SDOH — HEALTH STABILITY: PHYSICAL HEALTH: ON AVERAGE, HOW MANY DAYS PER WEEK DO YOU ENGAGE IN MODERATE TO STRENUOUS EXERCISE (LIKE A BRISK WALK)?: 6 DAYS

## 2024-06-07 ASSESSMENT — ASTHMA QUESTIONNAIRES
QUESTION_5 LAST FOUR WEEKS HOW WOULD YOU RATE YOUR ASTHMA CONTROL: COMPLETELY CONTROLLED
ACT_TOTALSCORE: 25
ACT_TOTALSCORE: 25
QUESTION_1 LAST FOUR WEEKS HOW MUCH OF THE TIME DID YOUR ASTHMA KEEP YOU FROM GETTING AS MUCH DONE AT WORK, SCHOOL OR AT HOME: NONE OF THE TIME
QUESTION_2 LAST FOUR WEEKS HOW OFTEN HAVE YOU HAD SHORTNESS OF BREATH: NOT AT ALL
QUESTION_4 LAST FOUR WEEKS HOW OFTEN HAVE YOU USED YOUR RESCUE INHALER OR NEBULIZER MEDICATION (SUCH AS ALBUTEROL): NOT AT ALL
QUESTION_3 LAST FOUR WEEKS HOW OFTEN DID YOUR ASTHMA SYMPTOMS (WHEEZING, COUGHING, SHORTNESS OF BREATH, CHEST TIGHTNESS OR PAIN) WAKE YOU UP AT NIGHT OR EARLIER THAN USUAL IN THE MORNING: NOT AT ALL

## 2024-06-07 ASSESSMENT — SOCIAL DETERMINANTS OF HEALTH (SDOH): HOW OFTEN DO YOU GET TOGETHER WITH FRIENDS OR RELATIVES?: ONCE A WEEK

## 2024-06-12 ENCOUNTER — OFFICE VISIT (OUTPATIENT)
Dept: FAMILY MEDICINE | Facility: OTHER | Age: 24
End: 2024-06-12
Payer: COMMERCIAL

## 2024-06-12 VITALS
BODY MASS INDEX: 25.08 KG/M2 | HEART RATE: 69 BPM | HEIGHT: 65 IN | RESPIRATION RATE: 14 BRPM | TEMPERATURE: 97.8 F | SYSTOLIC BLOOD PRESSURE: 108 MMHG | DIASTOLIC BLOOD PRESSURE: 62 MMHG | OXYGEN SATURATION: 99 % | WEIGHT: 150.5 LBS

## 2024-06-12 DIAGNOSIS — Z12.4 CERVICAL CANCER SCREENING: ICD-10-CM

## 2024-06-12 DIAGNOSIS — Z00.00 ROUTINE GENERAL MEDICAL EXAMINATION AT A HEALTH CARE FACILITY: Primary | ICD-10-CM

## 2024-06-12 DIAGNOSIS — G43.009 MIGRAINE WITHOUT AURA AND WITHOUT STATUS MIGRAINOSUS, NOT INTRACTABLE: ICD-10-CM

## 2024-06-12 DIAGNOSIS — Z11.59 NEED FOR HEPATITIS B SCREENING TEST: ICD-10-CM

## 2024-06-12 DIAGNOSIS — Z11.3 SCREEN FOR STD (SEXUALLY TRANSMITTED DISEASE): ICD-10-CM

## 2024-06-12 LAB
CLUE CELLS: ABNORMAL
TRICHOMONAS, WET PREP: ABNORMAL
WBC'S/HIGH POWER FIELD, WET PREP: ABNORMAL
YEAST, WET PREP: ABNORMAL

## 2024-06-12 PROCEDURE — 87491 CHLMYD TRACH DNA AMP PROBE: CPT | Performed by: NURSE PRACTITIONER

## 2024-06-12 PROCEDURE — 36415 COLL VENOUS BLD VENIPUNCTURE: CPT | Performed by: NURSE PRACTITIONER

## 2024-06-12 PROCEDURE — 87340 HEPATITIS B SURFACE AG IA: CPT | Performed by: NURSE PRACTITIONER

## 2024-06-12 PROCEDURE — 90715 TDAP VACCINE 7 YRS/> IM: CPT | Performed by: NURSE PRACTITIONER

## 2024-06-12 PROCEDURE — 87210 SMEAR WET MOUNT SALINE/INK: CPT | Performed by: NURSE PRACTITIONER

## 2024-06-12 PROCEDURE — 90471 IMMUNIZATION ADMIN: CPT | Performed by: NURSE PRACTITIONER

## 2024-06-12 PROCEDURE — 87591 N.GONORRHOEAE DNA AMP PROB: CPT | Performed by: NURSE PRACTITIONER

## 2024-06-12 PROCEDURE — 86706 HEP B SURFACE ANTIBODY: CPT | Performed by: NURSE PRACTITIONER

## 2024-06-12 PROCEDURE — 99395 PREV VISIT EST AGE 18-39: CPT | Mod: 25 | Performed by: NURSE PRACTITIONER

## 2024-06-12 PROCEDURE — 99213 OFFICE O/P EST LOW 20 MIN: CPT | Mod: 25 | Performed by: NURSE PRACTITIONER

## 2024-06-12 RX ORDER — RIZATRIPTAN BENZOATE 5 MG/1
5 TABLET ORAL
COMMUNITY
Start: 2023-07-11 | End: 2024-06-12

## 2024-06-12 RX ORDER — METHYLPREDNISOLONE 4 MG
4 TABLET, DOSE PACK ORAL SEE ADMIN INSTRUCTIONS
COMMUNITY
Start: 2023-10-18 | End: 2024-06-12

## 2024-06-12 ASSESSMENT — PAIN SCALES - GENERAL: PAINLEVEL: NO PAIN (0)

## 2024-06-12 NOTE — PATIENT INSTRUCTIONS
"Preventive Care Advice   This is general advice we often give to help people stay healthy. Your care team may have specific advice just for you. Please talk to your care team about your own preventive care needs.  Lifestyle  Exercise at least 150 minutes each week (30 minutes a day, 5 days a week).  Do muscle strengthening activities 2 days a week. These help control your weight and prevent disease.  No smoking.  Wear sunscreen to prevent skin cancer.  Have your home tested for radon every 2 to 5 years. Radon is a colorless, odorless gas that can harm your lungs. To learn more, go to www.health.[facility].mn. and search for \"Radon in Homes.\"  Keep guns unloaded and locked up in a safe place like a safe or gun vault, or, use a gun lock and hide the keys. Always lock away bullets separately. To learn more, visit [employer]mn.gov and search for \"safe gun storage.\"  Nutrition  Eat 5 or more servings of fruits and vegetables each day.  Try wheat bread, brown rice and whole grain pasta (instead of white bread, rice, and pasta).  Get enough calcium and vitamin D. Check the label on foods and aim for 100% of the RDA (recommended daily allowance).  Regular exams  Have a dental exam and cleaning every 6 months.  See your health care team every year to talk about:  Any changes in your health.  Any medicines your care team has prescribed.  Preventive care, family planning, and ways to prevent chronic diseases.  Shots (vaccines)   HPV shots (up to age 26), if you've never had them before.  Hepatitis B shots (up to age 59), if you've never had them before.  COVID-19 shot: Get this shot when it's due.  Flu shot: Get a flu shot every year.  Tetanus shot: Get a tetanus shot every 10 years.  Pneumococcal, hepatitis A, and RSV shots: Ask your care team if you need these based on your risk.  Shingles shot (for age 50 and up).  General health tests  Diabetes screening:  Starting at age 35, Get screened for diabetes at least every 3 years.  If " you are younger than age 35, ask your care team if you should be screened for diabetes.  Cholesterol test: At age 39, start having a cholesterol test every 5 years, or more often if advised.  Bone density scan (DEXA): At age 50, ask your care team if you should have this scan for osteoporosis (brittle bones).  Hepatitis C: Get tested at least once in your life.  Abdominal aortic aneurysm screening: Talk to your doctor about having this screening if you:  Have ever smoked; and  Are biologically male; and  Are between the ages of 65 and 75.  STIs (sexually transmitted infections)  Before age 24: Ask your care team if you should be screened for STIs.  After age 24: Get screened for STIs if you're at risk. You are at risk for STIs (including HIV) if:  You are sexually active with more than one person.  You don't use condoms every time.  You or a partner was diagnosed with a sexually transmitted infection.  If you are at risk for HIV, ask about PrEP medicine to prevent HIV.  Get tested for HIV at least once in your life, whether you are at risk for HIV or not.  Cancer screening tests  Cervical cancer screening: If you have a cervix, begin getting regular cervical cancer screening tests at age 21. Most people who have regular screenings with normal results can stop after age 65. Talk about this with your provider.  Breast cancer scan (mammogram): If you've ever had breasts, begin having regular mammograms starting at age 40. This is a scan to check for breast cancer.  Colon cancer screening: It is important to start screening for colon cancer at age 45.  Have a colonoscopy test every 10 years (or more often if you're at risk) Or, ask your provider about stool tests like a FIT test every year or Cologuard test every 3 years.  To learn more about your testing options, visit: www.Conject/389278.pdf.  For help making a decision, visit: manny/yn99472.  Prostate cancer screening test: If you have a prostate and are age 55  to 69, ask your provider if you would benefit from a yearly prostate cancer screening test.  Lung cancer screening: If you are a current or former smoker age 50 to 80, ask your care team if ongoing lung cancer screenings are right for you.  For informational purposes only. Not to replace the advice of your health care provider. Copyright   2023 [employer]. All rights reserved. Clinically reviewed by the [facility]. [employer] 410511 - REV 04/24.  Learning About Stress  What is stress?     Stress is your body's response to a hard situation. Your body can have a physical, emotional, or mental response. Stress is a fact of life for most people, and it affects everyone differently. What causes stress for you may not be stressful for someone else.  A lot of things can cause stress. You may feel stress when you go on a job interview, take a test, or run a race. This kind of short-term stress is normal and even useful. It can help you if you need to work hard or react quickly. For example, stress can help you finish an important job on time.  Long-term stress is caused by ongoing stressful situations or events. Examples of long-term stress include long-term health problems, ongoing problems at work, or conflicts in your family. Long-term stress can harm your health.  How does stress affect your health?  When you are stressed, your body responds as though you are in danger. It makes hormones that speed up your heart, make you breathe faster, and give you a burst of energy. This is called the fight-or-flight stress response. If the stress is over quickly, your body goes back to normal and no harm is done.  But if stress happens too often or lasts too long, it can have bad effects. Long-term stress can make you more likely to get sick, and it can make symptoms of some diseases worse. If you tense up when you are stressed, you may develop neck, shoulder, or low back pain. Stress is  linked to high blood pressure and heart disease.  Stress also harms your emotional health. It can make you howard, tense, or depressed. Your relationships may suffer, and you may not do well at work or school.  What can you do to manage stress?  You can try these things to help manage stress:   Do something active. Exercise or activity can help reduce stress. Walking is a great way to get started. Even everyday activities such as housecleaning or yard work can help.  Try yoga or lester chi. These techniques combine exercise and meditation. You may need some training at first to learn them.  Do something you enjoy. For example, listen to music or go to a movie. Practice your hobby or do volunteer work.  Meditate. This can help you relax, because you are not worrying about what happened before or what may happen in the future.  Do guided imagery. Imagine yourself in any setting that helps you feel calm. You can use online videos, books, or a teacher to guide you.  Do breathing exercises. For example:  From a standing position, bend forward from the waist with your knees slightly bent. Let your arms dangle close to the floor.  Breathe in slowly and deeply as you return to a standing position. Roll up slowly and lift your head last.  Hold your breath for just a few seconds in the standing position.  Breathe out slowly and bend forward from the waist.  Let your feelings out. Talk, laugh, cry, and express anger when you need to. Talking with supportive friends or family, a counselor, or a jayce leader about your feelings is a healthy way to relieve stress. Avoid discussing your feelings with people who make you feel worse.  Write. It may help to write about things that are bothering you. This helps you find out how much stress you feel and what is causing it. When you know this, you can find better ways to cope.  What can you do to prevent stress?  You might try some of these things to help prevent stress:  Manage your time.  "This helps you find time to do the things you want and need to do.  Get enough sleep. Your body recovers from the stresses of the day while you are sleeping.  Get support. Your family, friends, and community can make a difference in how you experience stress.  Limit your news feed. Avoid or limit time on social media or news that may make you feel stressed.  Do something active. Exercise or activity can help reduce stress. Walking is a great way to get started.  Where can you learn more?  Go to https://www.[employer].net/patiented  Enter N032 in the search box to learn more about \"Learning About Stress.\"  Current as of: October 24, 2023               Content Version: 14.0    2415-[address] [employer]   Care instructions adapted under license by your healthcare professional. If you have questions about a medical condition or this instruction, always ask your healthcare professional. [employer] disclaims any warranty or liability for your use of this information.      "

## 2024-06-12 NOTE — PROGRESS NOTES
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    Weight as of this encounter: 68.3 kg (150 lb 8 oz).   Weight management plan: Discussed healthy diet and exercise guidelines    Counseling  Appropriate preventive services were discussed with this patient, including applicable screening as appropriate for fall prevention, nutrition, physical activity, Tobacco-use cessation, weight loss and cognition.  Checklist reviewing preventive services available has been given to the patient.  Reviewed patient's diet, addressing concerns and/or questions.       See Patient Instructions    Keysha Arias is a 23 year old, presenting for the following:  Physical        6/12/2024     8:20 AM   Additional Questions   Roomed by Elvie LLAMAS   Accompanied by Self        Health Care Directive  Patient does not have a Health Care Directive or Living Will: Discussed advance care planning with patient; information given to patient to " review.    HPI    Neurologist- injections once a month.     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     Ortho-Cyclen- Periods have been on BC since 7-8th grade. Continue to be painful even if taking birth control through period.             6/7/2024   General Health   How would you rate your overall physical health? Excellent   Feel stress (tense, anxious, or unable to sleep) Very much   (!) STRESS CONCERN      6/7/2024   Nutrition   Three or more servings of calcium each day? Yes   Diet: Regular (no restrictions)   How many servings of fruit and vegetables per day? (!) 2-3   How many sweetened beverages each day? 0-1         6/7/2024   Exercise   Days per week of moderate/strenous exercise 6 days   Average minutes spent exercising at this level 40 min         6/7/2024   Social Factors   Frequency of gathering with friends or relatives Once a week   Worry food won't last until get money to buy more No   Food not last or not have enough money for food? No   Do you have housing?  Yes   Are you worried about losing your housing? No   Lack of transportation? No   Unable to get utilities (heat,electricity)? No         6/7/2024   Dental   Dentist two times every year? Yes         6/7/2024   TB Screening   Were you born outside of the [place]? No         Today's PHQ-2 Score:       6/12/2024     8:16 AM   PHQ-2 ( 1999 [employer])   Q1: Little interest or pleasure in doing things 0   Q2: Feeling down, depressed or hopeless 0   PHQ-2 Score 0   Q1: Little interest or pleasure in doing things Not at all   Q2: Feeling down, depressed or hopeless Not at all   PHQ-2 Score 0           6/7/2024   Substance Use   Alcohol more than 3/day or more than 7/wk No   Do you use any other substances recreationally? No     Social History     Tobacco Use    Smoking status: Never    Smokeless tobacco: Never    Tobacco comments:     No smokers in the home   Vaping Use    Vaping status: Never Used   Substance Use Topics    Alcohol use: No    Drug  "use: No         6/7/2024   STI Screening   New sexual partner(s) since last STI/HIV test? No     History of abnormal Pap smear: No - age 21-29 PAP every 3 years recommended             6/7/2024   Contraception/Family Planning   Questions about contraception or family planning (!) YES      Reviewed and updated as needed this visit by Provider                    Lab work is in process      Review of Systems  Constitutional, HEENT, cardiovascular, pulmonary, gi and gu systems are negative, except as otherwise noted.     Objective    Exam  /62   Pulse 69   Temp 97.8  F (36.6  C) (Temporal)   Resp 14   Ht 1.644 m (5' 4.72\")   Wt 68.3 kg (150 lb 8 oz)   LMP 05/24/2024 (Approximate)   SpO2 99%   BMI 25.26 kg/m     Estimated body mass index is 25.26 kg/m  as calculated from the following:    Height as of this encounter: 1.644 m (5' 4.72\").    Weight as of this encounter: 68.3 kg (150 lb 8 oz).    Physical Exam  GENERAL: alert and no distress  EYES: Eyes grossly normal to inspection, PERRL and conjunctivae and sclerae normal  HENT: ear canals and TM's normal, nose and mouth without ulcers or lesions  NECK: no adenopathy, no asymmetry, masses, or scars  RESP: lungs clear to auscultation - no rales, rhonchi or wheezes  CV: regular rate and rhythm, normal S1 S2, no S3 or S4, no murmur, click or rub, no peripheral edema  ABDOMEN: soft, nontender, no hepatosplenomegaly, no masses and bowel sounds normal  SKIN: no suspicious lesions or rashes  NEURO: Normal strength and tone, mentation intact and speech normal  PSYCH: mentation appears normal, affect normal/bright        Signed Electronically by: VIKI Perkins CNP    "

## 2024-06-12 NOTE — LETTER
My Asthma Action Plan    Name: Juana Bacon   YOB: 2000  Date: 6/12/2024   My doctor: VIKI Perkins CNP   My clinic: [facility]        My Rescue Medicine:   Albuterol inhaler (Proair/Ventolin/Proventil HFA)  2-4 puffs EVERY 4 HOURS as needed. Use a spacer if recommended by your provider.   My Asthma Severity:   Intermittent / Exercise Induced  Know your asthma triggers: exercise or sports             GREEN ZONE   Good Control  I feel good  No cough or wheeze  Can work, sleep and play without asthma symptoms       Take your asthma control medicine every day.     If exercise triggers your asthma, take your rescue medication  15 minutes before exercise or sports, and  During exercise if you have asthma symptoms  Spacer to use with inhaler: If you have a spacer, make sure to use it with your inhaler             YELLOW ZONE Getting Worse  I have ANY of these:  I do not feel good  Cough or wheeze  Chest feels tight  Wake up at night   Keep taking your Green Zone medications  Start taking your rescue medicine:  every 20 minutes for up to 1 hour. Then every 4 hours for 24-48 hours.  If you stay in the Yellow Zone for more than 12-24 hours, contact your doctor.  If you do not return to the Green Zone in 12-24 hours or you get worse, start taking your oral steroid medicine if prescribed by your provider.           RED ZONE Medical Alert - Get Help  I have ANY of these:  I feel awful  Medicine is not helping  Breathing getting harder  Trouble walking or talking  Nose opens wide to breathe       Take your rescue medicine NOW  If your provider has prescribed an oral steroid medicine, start taking it NOW  Call your doctor NOW  If you are still in the Red Zone after 20 minutes and you have not reached your doctor:  Take your rescue medicine again and  Call 911 or go to the emergency room right away    See your regular doctor within 2 weeks of an Emergency Room or Urgent Care visit  for follow-up treatment.          Annual Reminders:  Meet with Asthma Educator,  Flu Shot in the Fall, consider Pneumonia Vaccination for patients with asthma (aged 19 and older).    Pharmacy:    [facility] - [facility] [place] [address] [facility] [address] - [place], [place] - [address] [facility] [place] [address] [id number] - [place], [place] - [address] AT [facility] [address]  [id number] - [place], [place] - [address]    Electronically signed by VIKI Perkins CNP   Date: 06/12/24                    Asthma Triggers  How To Control Things That Make Your Asthma Worse    Triggers are things that make your asthma worse.  Look at the list below to help you find your triggers and   what you can do about them. You can help prevent asthma flare-ups by staying away from your triggers.      Trigger                                                          What you can do   Cigarette Smoke  Tobacco smoke can make asthma worse. Do not allow smoking in your home, car or around you.  Be sure no one smokes at a child s day care or school.  If you smoke, ask your health care provider for ways to help you quit.  Ask family members to quit too.  Ask your health care provider for a referral to Quit Plan to help you quit smoking, or call 3-940-167-PLAN.     Colds, Flu, Bronchitis  These are common triggers of asthma. Wash your hands often.  Don t touch your eyes, nose or mouth.  Get a flu shot every year.     Dust Mites  These are tiny bugs that live in cloth or carpet. They are too small to see. Wash sheets and blankets in hot water every week.   Encase pillows and mattress in dust mite proof covers.  Avoid having carpet if you can. If you have carpet, vacuum weekly.   Use a dust mask and HEPA vacuum.   Pollen and Outdoor Mold  Some people are allergic to trees, grass, or weed pollen,  or molds. Try to keep your windows closed.  Limit time out doors when pollen count is high.   Ask you health care provider about taking medicine during allergy season.     Animal Dander  Some people are allergic to skin flakes, urine or saliva from pets with fur or feathers. Keep pets with fur or feathers out of your home.    If you can t keep the pet outdoors, then keep the pet out of your bedroom.  Keep the bedroom door closed.  Keep pets off cloth furniture and away from stuffed toys.     Mice, Rats, and Cockroaches  Some people are allergic to the waste from these pests.   Cover food and garbage.  Clean up spills and food crumbs.  Store grease in the refrigerator.   Keep food out of the bedroom.   Indoor Mold  This can be a trigger if your home has high moisture. Fix leaking faucets, pipes, or other sources of water.   Clean moldy surfaces.  Dehumidify basement if it is damp and smelly.   Smoke, Strong Odors, and Sprays  These can reduce air quality. Stay away from strong odors and sprays, such as perfume, powder, hair spray, paints, smoke incense, paint, cleaning products, candles and new carpet.   Exercise or Sports  Some people with asthma have this trigger. Be active!  Ask your doctor about taking medicine before sports or exercise to prevent symptoms.    Warm up for 5-10 minutes before and after sports or exercise.     Other Triggers of Asthma  Cold air:  Cover your nose and mouth with a scarf.  Sometimes laughing or crying can be a trigger.  Some medicines and food can trigger asthma.

## 2024-06-13 ENCOUNTER — TELEPHONE (OUTPATIENT)
Dept: FAMILY MEDICINE | Facility: OTHER | Age: 24
End: 2024-06-13
Payer: COMMERCIAL

## 2024-06-13 LAB
C TRACH DNA SPEC QL PROBE+SIG AMP: NEGATIVE
HBV SURFACE AB SERPL IA-ACNC: <3.5 M[IU]/ML
HBV SURFACE AB SERPL IA-ACNC: NONREACTIVE M[IU]/ML
HBV SURFACE AG SERPL QL IA: NONREACTIVE
N GONORRHOEA DNA SPEC QL NAA+PROBE: NEGATIVE

## 2024-06-13 NOTE — TELEPHONE ENCOUNTER
Called and spoke with patient. She is currently scheduled to see Dr. Magaña on 6/21/24. She would like to start the series at this visit. Updated in appointment notes.     Karolina Covington MA

## 2024-06-13 NOTE — TELEPHONE ENCOUNTER
Please call patient to schedule Hepatitis B vaccine.       VIKI Perkins CNP  Questions or concerns please feel free to send me a [employer] message or call me  Phone : [phone]

## 2024-06-16 ENCOUNTER — MYC MEDICAL ADVICE (OUTPATIENT)
Dept: FAMILY MEDICINE | Facility: OTHER | Age: 24
End: 2024-06-16
Payer: COMMERCIAL

## 2024-06-17 NOTE — TELEPHONE ENCOUNTER
I would recommend taking 800 mg of ibuprofen before leaving for the procedure.  That would tend to work better than any topical numbing that I could order.  If she forgets, we can give her a dose when she arrives at the clinic.

## 2024-06-21 ENCOUNTER — OFFICE VISIT (OUTPATIENT)
Dept: FAMILY MEDICINE | Facility: OTHER | Age: 24
End: 2024-06-21
Payer: COMMERCIAL

## 2024-06-21 VITALS
OXYGEN SATURATION: 99 % | WEIGHT: 149 LBS | HEART RATE: 74 BPM | SYSTOLIC BLOOD PRESSURE: 122 MMHG | DIASTOLIC BLOOD PRESSURE: 60 MMHG | TEMPERATURE: 98.2 F | BODY MASS INDEX: 24.83 KG/M2 | HEIGHT: 65 IN | RESPIRATION RATE: 16 BRPM

## 2024-06-21 DIAGNOSIS — Z12.4 CERVICAL CANCER SCREENING: ICD-10-CM

## 2024-06-21 DIAGNOSIS — Z23 HEPATITIS B VACCINATION ADMINISTERED AT CURRENT VISIT: ICD-10-CM

## 2024-06-21 DIAGNOSIS — Z30.430 ENCOUNTER FOR INSERTION OF INTRAUTERINE CONTRACEPTIVE DEVICE: Primary | ICD-10-CM

## 2024-06-21 LAB — HCG UR QL: NEGATIVE

## 2024-06-21 PROCEDURE — 90471 IMMUNIZATION ADMIN: CPT | Performed by: FAMILY MEDICINE

## 2024-06-21 PROCEDURE — 81025 URINE PREGNANCY TEST: CPT | Performed by: FAMILY MEDICINE

## 2024-06-21 PROCEDURE — G0145 SCR C/V CYTO,THINLAYER,RESCR: HCPCS | Performed by: FAMILY MEDICINE

## 2024-06-21 PROCEDURE — 99213 OFFICE O/P EST LOW 20 MIN: CPT | Mod: 25 | Performed by: FAMILY MEDICINE

## 2024-06-21 PROCEDURE — 90746 HEPB VACCINE 3 DOSE ADULT IM: CPT | Performed by: FAMILY MEDICINE

## 2024-06-21 PROCEDURE — 58300 INSERT INTRAUTERINE DEVICE: CPT | Performed by: FAMILY MEDICINE

## 2024-06-21 PROCEDURE — G0124 SCREEN C/V THIN LAYER BY MD: HCPCS | Performed by: PATHOLOGY

## 2024-06-21 ASSESSMENT — PAIN SCALES - GENERAL: PAINLEVEL: NO PAIN (0)

## 2024-06-21 NOTE — PROGRESS NOTES
Prior to immunization administration, verified patients identity using patient s name and date of birth. Please see Immunization Activity for additional information.     Screening Questionnaire for Pediatric Immunization    Is the child sick today?   No   Does the child have allergies to medications, food, a vaccine component, or latex?   No   Has the child had a serious reaction to a vaccine in the past?   No   Does the child have a long-term health problem with lung, heart, kidney or metabolic disease (e.g., diabetes), asthma, a blood disorder, no spleen, complement component deficiency, a cochlear implant, or a spinal fluid leak?  Is he/she on long-term aspirin therapy?   No   If the child to be vaccinated is 2 through 4 years of age, has a healthcare provider told you that the child had wheezing or asthma in the  past 12 months?   No   If your child is a baby, have you ever been told he or she has had intussusception?   No   Has the child, sibling or parent had a seizure, has the child had brain or other nervous system problems?   No   Does the child have cancer, leukemia, AIDS, or any immune system         problem?   No   Does the child have a parent, brother, or sister with an immune system problem?   No   In the past 3 months, has the child taken medications that affect the immune system such as prednisone, other steroids, or anticancer drugs; drugs for the treatment of rheumatoid arthritis, Crohn s disease, or psoriasis; or had radiation treatments?   No   In the past year, has the child received a transfusion of blood or blood products, or been given immune (gamma) globulin or an antiviral drug?   No   Is the child/teen pregnant or is there a chance that she could become       pregnant during the next month?   No   Has the child received any vaccinations in the past 4 weeks?   No               Immunization questionnaire answers were all negative.      Patient instructed to remain in clinic for 15 minutes  afterwards, and to report any adverse reactions.     Screening performed by Elvie Spring MA on 6/21/2024 at 1:43 PM.

## 2024-06-21 NOTE — PATIENT INSTRUCTIONS
Please call or come in if you are having fevers, increasing abdominal pain, foul-smelling discharge, or other worrisome symptoms.    We will let you know your results as soon as we can.  If you have MyChart, you will be able to see your lab and imaging results shortly after they become available.  I will review these and let you know my interpretation, but this usually takes additional time.  If you have multiple labs, I usually wait until they are all back before sending a note about them unless something is worrisome.  If you do not have MyChart, we will let you know your lab results as soon as we can.  If they are normal, this can take up to a week.     Contact us or return if questions or concerns.    Have a nice day!    Dr. Magaña

## 2024-06-21 NOTE — PROGRESS NOTES
"IUD Insertion:  CONSULT:    Is a pregnancy test required: Yes.  Was it positive or negative?  Negative  Was a consent obtained?  Yes    Subjective: Juana Bacon is a 23 year old No obstetric history on file. presents for IUD and desires Kyleena type IUD.    Patient has been given the opportunity to ask questions about all forms of birth control, including all options appropriate for Juana Bacon. Discussed that no method of birth control, except abstinence is 100% effective against pregnancy or sexually transmitted infection.     Juana Bacon understands she may have the IUD removed at any time. IUD should be removed by a health care provider.    The entire insertion procedure was reviewed with the patient, including care after placement.    Patient's last menstrual period was 05/24/2024 (approximate). Has current contraception. No allergy to betadine or shellfish. Recent STD screening  hCG Urine Qualitative   Date Value Ref Range Status   06/21/2024 Negative Negative Final     Comment:     This test is for screening purposes.  Results should be interpreted along with the clinical picture.  Confirmation testing is available if warranted by ordering BRK268, HCG Quantitative Pregnancy.         /60 (BP Location: Left arm, Patient Position: Sitting, Cuff Size: Adult Regular)   Pulse 74   Temp 98.2  F (36.8  C) (Temporal)   Resp 16   Ht 1.645 m (5' 4.75\")   Wt 67.6 kg (149 lb)   LMP 05/24/2024 (Approximate)   SpO2 99%   BMI 24.99 kg/m      Pelvic Exam:   EG/BUS: normal genital architecture without lesions, erythema or abnormal secretions.   Vagina: moist, pink, rugae with physiologic discharge and secretions  Cervix: nulliparous no lesions and pink, moist, closed, without lesion or CMT  Uterus: mid-position, mobile, no pain  Adnexa: within normal limits and no masses, nodularity, tenderness    PROCEDURE NOTE: -- IUD Insertion    Reason for Insertion: contraception    Premedicated with " ibuprofen.  Under sterile technique, cervix was visualized with speculum and prepped with Betadine solution swab x 3. The uterus was gently straightened and sounded to 7.5 cm. IUD prepared for placement, and IUD inserted according to 's instructions without difficulty or significant resitance, and deployed at the fundus. The strings were visualized and trimmed to 6.0 cm from the external os. Tenaculum was removed and hemostasis noted. Speculum removed.  Patient tolerated procedure well.    EBL: minimal    Complications: none    ASSESSMENT:     ICD-10-CM    1. Encounter for insertion of intrauterine contraceptive device  Z30.430 INSERTION INTRAUTERINE DEVICE     HCG qualitative urine     HCG qualitative urine     levonorgestrel (KYLEENA) 19.5 MG IUD 1 each     DISCONTINUED: levonorgestrel (MIRENA) 52 MG (20 mcg/day) IUD     DISCONTINUED: levonorgestrel (MIRENA) 52 MG (20 mcg/day) IUD 1 each      2. Cervical cancer screening  Z12.4 Pap Screen Only - Recommended Age 21 - 24 Years      3. Hepatitis B vaccination administered at current visit  Z23              PLAN:    1.  Given 's handouts, including when to have IUD removed, list of danger s/sx, side effects and follow up recommended. Encouraged condom use for prevention of STD. Back up contraception advised for 7 days if progestin method. Advised to call for any fever, for prolonged or severe pain or bleeding, abnormal vaginal discharge, or unable to palpate strings. She was advised to use pain medications (ibuprofen) as needed for mild to moderate pain. Advised to follow-up in clinic in 4-6 weeks for IUD string check if unable to find strings or as directed by provider.   2.  Pap obtained.  3.  Hepatitis B immunization provided.  Patient tolerated well.  Discussed when to receive her second and third doses.  Would probably recommend rechecking titers 6 to 12 weeks after last dose.    Portions of this note were completed using Dragon dictation  software.  Although reviewed, there may be typographical and other inadvertent errors that remain.         Ramon Magaña MD, MD      Answers submitted by the patient for this visit:  General Questionnaire (Submitted on 6/16/2024)  Chief Complaint: Chronic problems general questions HPI Form  What is the reason for your visit today? : IUD Insertion  How many servings of fruits and vegetables do you eat daily?: 2-3  On average, how many sweetened beverages do you drink each day (Examples: soda, juice, sweet tea, etc.  Do NOT count diet or artificially sweetened beverages)?: 0  How many minutes a day do you exercise enough to make your heart beat faster?: 30 to 60  How many days a week do you exercise enough to make your heart beat faster?: 5  How many days per week do you miss taking your medication?: 0

## 2024-06-27 LAB
BKR LAB AP GYN ADEQUACY: ABNORMAL
BKR LAB AP GYN INTERPRETATION: ABNORMAL
BKR LAB AP HPV REFLEX: NO
BKR LAB AP PREVIOUS ABNORMAL: ABNORMAL
PATH REPORT.COMMENTS IMP SPEC: ABNORMAL
PATH REPORT.COMMENTS IMP SPEC: ABNORMAL
PATH REPORT.RELEVANT HX SPEC: ABNORMAL

## 2024-06-28 ENCOUNTER — PATIENT OUTREACH (OUTPATIENT)
Dept: FAMILY MEDICINE | Facility: OTHER | Age: 24
End: 2024-06-28
Payer: COMMERCIAL

## 2024-06-28 DIAGNOSIS — R87.610 ATYPICAL SQUAMOUS CELLS OF UNDETERMINED SIGNIFICANCE (ASCUS) ON PAPANICOLAOU SMEAR OF CERVIX: Primary | ICD-10-CM

## 2024-06-28 NOTE — RESULT ENCOUNTER NOTE
ASCUS pap letter sent through My Chart results. Plan to repeat cytology alone in 1 year.     Sol Real, RN, PHN, BSN  Pap Tracking Nurse

## 2024-10-04 ENCOUNTER — TRANSFERRED RECORDS (OUTPATIENT)
Dept: HEALTH INFORMATION MANAGEMENT | Facility: CLINIC | Age: 24
End: 2024-10-04
Payer: COMMERCIAL

## 2024-10-22 ENCOUNTER — TELEPHONE (OUTPATIENT)
Dept: FAMILY MEDICINE | Facility: CLINIC | Age: 24
End: 2024-10-22
Payer: COMMERCIAL

## 2024-10-22 ENCOUNTER — MYC MEDICAL ADVICE (OUTPATIENT)
Dept: FAMILY MEDICINE | Facility: OTHER | Age: 24
End: 2024-10-22
Payer: COMMERCIAL

## 2024-10-22 NOTE — TELEPHONE ENCOUNTER
General Call    Contacts       Contact Date/Time Type Contact Phone/Fax    10/22/2024 03:46 PM CDT Phone (Incoming) Katiana Baconlyn BETHANIE (Self) [phone] (M)          Reason for Call:  Patient wants to know how fast would a whooping cough lab result come back    What are your questions or concerns:  She had one done at [facility] and it is going to be 5 days. She is going out of town tomorrow and was wondering if we could it done sooner.    Date of last appointment with provider: 6/21/2024    Could we send this information to you in [facility] or would you prefer to receive a phone call?:   Patient would prefer a phone call   Okay to leave a detailed message?: Yes at Cell number on file:    Telephone Information:   Mobile [phone]

## 2024-10-24 NOTE — TELEPHONE ENCOUNTER
MyChart sent regarding whooping cough testing, patient has seen message. Closing here.     Keara Dye, RN, BSN

## 2024-11-04 ENCOUNTER — OFFICE VISIT (OUTPATIENT)
Dept: FAMILY MEDICINE | Facility: CLINIC | Age: 24
End: 2024-11-04
Payer: COMMERCIAL

## 2024-11-04 VITALS
SYSTOLIC BLOOD PRESSURE: 130 MMHG | HEIGHT: 65 IN | WEIGHT: 154 LBS | BODY MASS INDEX: 25.66 KG/M2 | TEMPERATURE: 97.1 F | HEART RATE: 73 BPM | OXYGEN SATURATION: 98 % | RESPIRATION RATE: 13 BRPM | DIASTOLIC BLOOD PRESSURE: 72 MMHG

## 2024-11-04 DIAGNOSIS — Z01.818 PREOP GENERAL PHYSICAL EXAM: Primary | ICD-10-CM

## 2024-11-04 DIAGNOSIS — S83.511A RUPTURE OF ANTERIOR CRUCIATE LIGAMENT OF RIGHT KNEE, INITIAL ENCOUNTER: ICD-10-CM

## 2024-11-04 LAB — HCG UR QL: NEGATIVE

## 2024-11-04 PROCEDURE — 81025 URINE PREGNANCY TEST: CPT | Performed by: NURSE PRACTITIONER

## 2024-11-04 PROCEDURE — G2211 COMPLEX E/M VISIT ADD ON: HCPCS | Performed by: NURSE PRACTITIONER

## 2024-11-04 PROCEDURE — 99214 OFFICE O/P EST MOD 30 MIN: CPT | Performed by: NURSE PRACTITIONER

## 2024-11-04 RX ORDER — AZITHROMYCIN 250 MG/1
TABLET, FILM COATED ORAL
COMMUNITY
Start: 2024-10-22 | End: 2024-11-04

## 2024-11-04 RX ORDER — BENZONATATE 100 MG/1
CAPSULE ORAL
COMMUNITY
Start: 2024-10-22 | End: 2024-11-04

## 2024-11-04 ASSESSMENT — ASTHMA QUESTIONNAIRES
QUESTION_1 LAST FOUR WEEKS HOW MUCH OF THE TIME DID YOUR ASTHMA KEEP YOU FROM GETTING AS MUCH DONE AT WORK, SCHOOL OR AT HOME: NONE OF THE TIME
ACT_TOTALSCORE: 25
ACT_TOTALSCORE: 25
QUESTION_5 LAST FOUR WEEKS HOW WOULD YOU RATE YOUR ASTHMA CONTROL: COMPLETELY CONTROLLED
QUESTION_3 LAST FOUR WEEKS HOW OFTEN DID YOUR ASTHMA SYMPTOMS (WHEEZING, COUGHING, SHORTNESS OF BREATH, CHEST TIGHTNESS OR PAIN) WAKE YOU UP AT NIGHT OR EARLIER THAN USUAL IN THE MORNING: NOT AT ALL
QUESTION_2 LAST FOUR WEEKS HOW OFTEN HAVE YOU HAD SHORTNESS OF BREATH: NOT AT ALL
QUESTION_4 LAST FOUR WEEKS HOW OFTEN HAVE YOU USED YOUR RESCUE INHALER OR NEBULIZER MEDICATION (SUCH AS ALBUTEROL): NOT AT ALL

## 2024-11-04 NOTE — PROGRESS NOTES
Preoperative Evaluation  [facility]  [address] [address]  Phone: [phone]  Fax: [phone]  Primary Provider: VIKI Perkins CNP  Pre-op Performing Provider: VIKI Pastor CNP  Nov 4, 2024             10/30/2024   Surgical Information   What procedure is being done? ACL Reconstruction surgery, right    Facility or Hospital where procedure/surgery will be performed: [facility]    Who is doing the procedure / surgery? Javier Sweeney    Date of surgery / procedure: November 8    Time of surgery / procedure: Not sure yet    Where do you plan to recover after surgery? at home with family        Patient-reported     Fax number for surgical facility: [phone]    Assessment & Plan     The proposed surgical procedure is considered INTERMEDIATE risk.    Preop general physical exam  **  - HCG qualitative urine    Rupture of anterior cruciate ligament of right knee, initial encounter  **      - No identified additional risk factors other than previously addressed    Antiplatelet or Anticoagulation Medication Instructions   - Patient is on no antiplatelet or anticoagulation medications.    Additional Medication Instructions    Patient Instructions   -Do not take aspirin starting 7 days prior to your surgery unless specifically told otherwise.     Check with Ortho about med for after surgery.     -Do not take NSAIDs (ex: ibuprofen/Advil, naproxen/Aleve, Motrin) starting 7 days prior to your surgery.     -Do not take omega 3 fatty acid supplements (like fish oil) and any oral vitamin E supplement starting 7 days prior to your surgery.    Stop all vitamins unless specifically told otherwise.      -OK to use acetaminophen (Tylenol) for pain control until your surgery.     The surgery team or pre-operative nurse will give you detailed information about restrictions on eating and drinking before surgery and if you need to complete a special  bathing procedure prior to surgery. Typically, it is nothing to eat or drink at least 8-10 hours prior to help prevent aspiration.      Recommendation  Approval given to proceed with proposed procedure, without further diagnostic evaluationAziza Arias is a 23 year old, presenting for the following:  Pre-Op Exam          11/4/2024     8:47 AM   Additional Questions   Roomed by Michelle SMITH related to upcoming procedure: right knee pain, ACL tear.         10/30/2024   Pre-Op Questionnaire   Have you ever had a heart attack or stroke? No    Have you ever had surgery on your heart or blood vessels, such as a stent placement, a coronary artery bypass, or surgery on an artery in your head, neck, heart, or legs? No    Do you have chest pain with activity? No    Do you have a history of heart failure? No    Do you currently have a cold, bronchitis or symptoms of other infection? No    Do you have a cough, shortness of breath, or wheezing? No    Do you or anyone in your family have previous history of blood clots? No    Do you or does anyone in your family have a serious bleeding problem such as prolonged bleeding following surgeries or cuts? No    Have you ever had problems with anemia or been told to take iron pills? No    Have you had any abnormal blood loss such as black, tarry or bloody stools, or abnormal vaginal bleeding? No    Have you ever had a blood transfusion? No    Are you willing to have a blood transfusion if it is medically needed before, during, or after your surgery? Yes    Have you or any of your relatives ever had problems with anesthesia? (!) UNKNOWN     Do you have sleep apnea, excessive snoring or daytime drowsiness? No    Do you have any artifical heart valves or other implanted medical devices like a pacemaker, defibrillator, or continuous glucose monitor? No    Do you have artificial joints? No    Are you allergic to latex? No        Patient-reported     Health Care  Directive  Patient does not have a Health Care Directive: Discussed advance care planning with patient; information given to patient to review.    Preoperative Review of    reviewed - no record of controlled substances prescribed.      Status of Chronic Conditions:  See problem list for active medical problems.  Problems all longstanding and stable, except as noted/documented.  See ROS for pertinent symptoms related to these conditions.    Patient Active Problem List    Diagnosis Date Noted    Atypical squamous cells of undetermined significance (ASCUS) on Papanicolaou smear of cervix 06/21/2024     Priority: Medium     6/21/24 ASCUS pap @ 22 yo. Plan: pap in 1 yr.   6/28/24 Mychart result note sent. Seen by patient Juana Bacon on 6/28/2024  7:00 AM       Pain in joint involving ankle and foot, left 07/28/2023     Priority: Medium    Migraine without aura and without status migrainosus, not intractable 01/04/2022     Priority: Medium     Formatting of this note might be different from the original.   Last Assessment & Plan:    Formatting of this note might be different from the original.   Migraine headaches, without aura.  Dramatically improved on the Ajovy.       Recommendations:   1.  There was no significant response in the past 2 amitriptyline or propranolol.  She has had an excellent response to Ajovy for migraine prophylaxis.  Recommend continuing the Ajovy monthly for migraine prophylaxis.   2.  She has had poor response to triptan therapy for acute migraine therapy, however does respond well to naproxen.  Plan to continue naproxen as needed which she is generally only needing once a month.   3.  Continue current oral contraception    4.  Plan follow-up in neurology in 1 year, earlier if needed      Excessive or frequent menstruation 04/18/2017     Priority: Medium      Past Medical History:   Diagnosis Date    Exercise-induced asthma     Hydronephrosis, right 01/14/2017    PONV (postoperative  "nausea and vomiting)     Unspecified fetal and  jaundice     treated with phototherapy in the  period    Volume depletion 2003    hospitalized at 7 mo for dehydration with Rotavirus     Past Surgical History:   Procedure Laterality Date    ARTHROTOMY ANKLE Left 2022    Procedure: Excision os trigonum left ankle;  Surgeon: Jose Garza DPM;  Location: [facility] OR    EXCISE MASS FOOT  2013    Procedure: EXCISE MASS FOOT;  Excise soft tissue mass left foot;  Surgeon: Mick Walden DPM;  Location: [facility] OR    NO HISTORY OF SURGERY       Current Outpatient Medications   Medication Sig Dispense Refill    EPINEPHrine (ANY BX GENERIC EQUIV) 0.3 MG/0.3ML injection 2-pack Inject 0.3 mLs (0.3 mg) into the muscle as needed for anaphylaxis. May repeat one time in 5-15 minutes if response to initial dose is inadequate. 2 each 0    Fremanezumab-vfrm (AJOVY SC) Inject 1 Application Subcutaneous every 30 days      levonorgestrel (KYLEENA) 19.5 MG IUD 1 each by Intrauterine route once      naproxen (NAPROSYN) 500 MG tablet TK 1 T PO BID PRF HA         Allergies   Allergen Reactions    Amoxicillin Hives    Hornets         Social History     Tobacco Use    Smoking status: Never    Smokeless tobacco: Never    Tobacco comments:     No smokers in the home   Substance Use Topics    Alcohol use: No     Family History   Problem Relation Age of Onset    Congenital Anomalies Sister          at 3 days old from complications of intrauterine chicken pox    Diabetes Paternal Grandfather     Cancer Paternal Grandfather         kidney    Hypertension Paternal Grandfather     Cancer Paternal Grandmother         voice box     History   Drug Use No             Review of Systems  Constitutional, HEENT, cardiovascular, pulmonary, gi and gu systems are negative, except as otherwise noted.    Objective    Ht 1.645 m (5' 4.75\")   BMI 24.99 kg/m     Estimated body mass index is 24.99 kg/m  as calculated from the " "following:    Height as of this encounter: 1.645 m (5' 4.75\").    Weight as of 6/21/24: 67.6 kg (149 lb).  Physical Exam  GENERAL: alert and no distress  EYES: Eyes grossly normal to inspection, PERRL and conjunctivae and sclerae normal  NECK: no adenopathy, no asymmetry, masses, or scars  RESP: lungs clear to auscultation - no rales, rhonchi or wheezes  CV: regular rate and rhythm, normal S1 S2, no S3 or S4, no murmur, click or rub, no peripheral edema  ABDOMEN: soft, nontender, no hepatosplenomegaly, no masses and bowel sounds normal  MS: no gross musculoskeletal defects noted, no edema  SKIN: no suspicious lesions or rashes  NEURO: Normal strength and tone, mentation intact and speech normal  PSYCH: mentation appears normal, affect normal/bright    No results for input(s): \"HGB\", \"PLT\", \"INR\", \"NA\", \"POTASSIUM\", \"CR\", \"A1C\" in the last 8760 hours.     Diagnostics  Labs pending at this time.  Results will be reviewed when available.   No EKG required for low risk surgery (cataract, skin procedure, breast biopsy, etc).  No EKG required, no history of coronary heart disease, significant arrhythmia, peripheral arterial disease or other structural heart disease.    Revised Cardiac Risk Index (RCRI)  The patient has the following serious cardiovascular risks for perioperative complications:   - No serious cardiac risks = 0 points     RCRI Interpretation: 0 points: Class I (very low risk - 0.4% complication rate)         Signed Electronically by: VIKI Pastor CNP  A copy of this evaluation report is provided to the requesting physician.         "

## 2024-11-04 NOTE — PATIENT INSTRUCTIONS
-Do not take aspirin starting 7 days prior to your surgery unless specifically told otherwise.     Check with Ortho about med for after surgery.     -Do not take NSAIDs (ex: ibuprofen/Advil, naproxen/Aleve, Motrin) starting 7 days prior to your surgery.     -Do not take omega 3 fatty acid supplements (like fish oil) and any oral vitamin E supplement starting 7 days prior to your surgery.    Stop all vitamins unless specifically told otherwise.      -OK to use acetaminophen (Tylenol) for pain control until your surgery.     The surgery team or pre-operative nurse will give you detailed information about restrictions on eating and drinking before surgery and if you need to complete a special bathing procedure prior to surgery. Typically, it is nothing to eat or drink at least 8-10 hours prior to help prevent aspiration.

## 2024-11-12 ENCOUNTER — DOCUMENTATION ONLY (OUTPATIENT)
Dept: OTHER | Facility: CLINIC | Age: 24
End: 2024-11-12
Payer: COMMERCIAL

## 2025-05-13 ENCOUNTER — PATIENT OUTREACH (OUTPATIENT)
Dept: CARE COORDINATION | Facility: CLINIC | Age: 25
End: 2025-05-13
Payer: COMMERCIAL

## 2025-05-27 ENCOUNTER — PATIENT OUTREACH (OUTPATIENT)
Dept: CARE COORDINATION | Facility: CLINIC | Age: 25
End: 2025-05-27
Payer: COMMERCIAL

## 2025-06-12 ENCOUNTER — PATIENT OUTREACH (OUTPATIENT)
Dept: FAMILY MEDICINE | Facility: OTHER | Age: 25
End: 2025-06-12
Payer: COMMERCIAL

## 2025-06-12 DIAGNOSIS — R87.610 ATYPICAL SQUAMOUS CELLS OF UNDETERMINED SIGNIFICANCE (ASCUS) ON PAPANICOLAOU SMEAR OF CERVIX: Primary | ICD-10-CM
